# Patient Record
Sex: FEMALE | Race: WHITE | NOT HISPANIC OR LATINO | RURAL
[De-identification: names, ages, dates, MRNs, and addresses within clinical notes are randomized per-mention and may not be internally consistent; named-entity substitution may affect disease eponyms.]

---

## 2017-10-23 ENCOUNTER — OFFICE VISIT (OUTPATIENT)
Dept: RETAIL CLINIC | Facility: CLINIC | Age: 52
End: 2017-10-23

## 2017-10-23 VITALS
WEIGHT: 132.6 LBS | OXYGEN SATURATION: 99 % | RESPIRATION RATE: 15 BRPM | HEART RATE: 97 BPM | HEIGHT: 64 IN | BODY MASS INDEX: 22.64 KG/M2 | TEMPERATURE: 96.7 F

## 2017-10-23 DIAGNOSIS — J01.41 ACUTE RECURRENT PANSINUSITIS: Primary | ICD-10-CM

## 2017-10-23 PROCEDURE — 99203 OFFICE O/P NEW LOW 30 MIN: CPT | Performed by: NURSE PRACTITIONER

## 2017-10-23 RX ORDER — AMOXICILLIN AND CLAVULANATE POTASSIUM 875; 125 MG/1; MG/1
1 TABLET, FILM COATED ORAL 2 TIMES DAILY
Qty: 20 TABLET | Refills: 0 | Status: SHIPPED | OUTPATIENT
Start: 2017-10-23 | End: 2017-11-02

## 2017-10-23 RX ORDER — PSEUDOEPHEDRINE HCL 120 MG/1
120 TABLET, FILM COATED, EXTENDED RELEASE ORAL EVERY 12 HOURS
Qty: 20 TABLET | Refills: 0 | Status: SHIPPED | OUTPATIENT
Start: 2017-10-23 | End: 2017-11-02

## 2017-10-23 NOTE — PROGRESS NOTES
"Mack Parker is a 52 y.o. female.     Sinusitis   This is a new problem. The current episode started in the past 7 days. The problem has been gradually worsening since onset. The pain is severe. Associated symptoms include congestion, headaches, sinus pressure and swollen glands. Pertinent negatives include no chills, coughing, ear pain, hoarse voice, neck pain, shortness of breath, sneezing or sore throat. Past treatments include nothing. The treatment provided no relief.        The following portions of the patient's history were reviewed and updated as appropriate: allergies, current medications, past medical history, past social history, past surgical history and problem list.    Review of Systems   Constitutional: Negative for appetite change, chills and fever.   HENT: Positive for congestion, postnasal drip, rhinorrhea, sinus pain and sinus pressure. Negative for ear pain, hoarse voice, sneezing, sore throat and trouble swallowing.    Eyes: Negative.    Respiratory: Negative for cough, shortness of breath and wheezing.    Cardiovascular: Negative.    Gastrointestinal: Negative for abdominal pain, diarrhea, nausea and vomiting.   Musculoskeletal: Negative.  Negative for neck pain.   Skin: Negative.    Neurological: Positive for headaches. Negative for dizziness.   Hematological: Positive for adenopathy.        Pulse 97  Temp 96.7 °F (35.9 °C) (Temporal Artery )   Resp 15  Ht 64\" (162.6 cm)  Wt 132 lb 9.6 oz (60.1 kg)  LMP  (LMP Unknown)  SpO2 99%  BMI 22.76 kg/m2     Objective   Physical Exam   Constitutional: She is oriented to person, place, and time. Vital signs are normal. She appears well-developed and well-nourished.   HENT:   Head: Normocephalic.   Right Ear: External ear and ear canal normal. No drainage, swelling or tenderness. Tympanic membrane is bulging. Tympanic membrane is not erythematous.   Left Ear: External ear and ear canal normal. No drainage, swelling or tenderness. " Tympanic membrane is bulging. Tympanic membrane is not erythematous.   Nose: Mucosal edema and rhinorrhea present. Right sinus exhibits maxillary sinus tenderness (severe ethmoid) and frontal sinus tenderness. Left sinus exhibits maxillary sinus tenderness (severe ethmoid) and frontal sinus tenderness.   Mouth/Throat: Uvula is midline, oropharynx is clear and moist and mucous membranes are normal. Tonsils are 0 on the right. Tonsils are 0 on the left. No tonsillar exudate.   Eyes: Conjunctivae are normal. Pupils are equal, round, and reactive to light.   Cardiovascular: Normal rate, regular rhythm, S1 normal, S2 normal and normal heart sounds.    Pulmonary/Chest: Effort normal and breath sounds normal. No respiratory distress. She has no wheezes.   Lymphadenopathy:        Head (right side): Tonsillar adenopathy present.        Head (left side): Tonsillar adenopathy present.     She has no cervical adenopathy.   Neurological: She is alert and oriented to person, place, and time.   Skin: Skin is warm, dry and intact. No rash noted.   Psychiatric: She has a normal mood and affect. Her speech is normal and behavior is normal. Thought content normal.   Vitals reviewed.      Assessment/Plan   Rachelle was seen today for sinusitis.    Diagnoses and all orders for this visit:    Acute recurrent pansinusitis  -     amoxicillin-clavulanate (AUGMENTIN) 875-125 MG per tablet; Take 1 tablet by mouth 2 (Two) Times a Day for 10 days.  -     pseudoephedrine (SUDAFED) 120 MG 12 hr tablet; Take 1 tablet by mouth Every 12 (Twelve) Hours for 10 days.

## 2017-11-02 ENCOUNTER — OFFICE VISIT (OUTPATIENT)
Dept: RETAIL CLINIC | Facility: CLINIC | Age: 52
End: 2017-11-02

## 2017-11-02 VITALS
BODY MASS INDEX: 22.2 KG/M2 | TEMPERATURE: 97.7 F | WEIGHT: 130 LBS | HEART RATE: 103 BPM | HEIGHT: 64 IN | RESPIRATION RATE: 20 BRPM | OXYGEN SATURATION: 97 %

## 2017-11-02 DIAGNOSIS — R05.9 COUGHING: Primary | ICD-10-CM

## 2017-11-02 PROCEDURE — 99213 OFFICE O/P EST LOW 20 MIN: CPT | Performed by: NURSE PRACTITIONER

## 2017-11-02 RX ORDER — BENZONATATE 200 MG/1
200 CAPSULE ORAL 3 TIMES DAILY PRN
Qty: 30 CAPSULE | Refills: 0 | Status: SHIPPED | OUTPATIENT
Start: 2017-11-02 | End: 2018-11-25

## 2017-11-02 RX ORDER — METHYLPREDNISOLONE 4 MG/1
TABLET ORAL
Qty: 21 TABLET | Refills: 0 | Status: SHIPPED | OUTPATIENT
Start: 2017-11-02 | End: 2018-02-13

## 2017-11-02 RX ORDER — DEXTROMETHORPHAN HYDROBROMIDE AND PROMETHAZINE HYDROCHLORIDE 15; 6.25 MG/5ML; MG/5ML
5 SYRUP ORAL 4 TIMES DAILY PRN
Qty: 240 ML | Refills: 0 | Status: SHIPPED | OUTPATIENT
Start: 2017-11-02 | End: 2017-11-12

## 2017-11-02 NOTE — PROGRESS NOTES
"Mack Parker is a 52 y.o. female.     Cough   This is a new problem. The current episode started in the past 7 days. The problem has been gradually worsening. The problem occurs every few minutes. The cough is non-productive. Associated symptoms include nasal congestion, postnasal drip and rhinorrhea. Pertinent negatives include no chest pain, chills, ear pain, fever, headaches, myalgias, sore throat, shortness of breath, weight loss or wheezing. Risk factors for lung disease include smoking/tobacco exposure. She has tried OTC cough suppressant for the symptoms. The treatment provided no relief. Her past medical history is significant for bronchitis and pneumonia. There is no history of asthma.        The following portions of the patient's history were reviewed and updated as appropriate: allergies, current medications, past medical history, past social history, past surgical history and problem list.    Review of Systems   Constitutional: Negative for appetite change, chills, fever and weight loss.   HENT: Positive for congestion, postnasal drip and rhinorrhea. Negative for ear pain, sinus pressure, sneezing, sore throat and trouble swallowing.    Eyes: Negative.    Respiratory: Positive for cough (severe, persistent cough). Negative for chest tightness, shortness of breath and wheezing.    Cardiovascular: Negative.  Negative for chest pain.   Gastrointestinal: Negative for diarrhea, nausea and vomiting.   Musculoskeletal: Positive for arthralgias. Negative for myalgias.   Skin: Negative.    Neurological: Negative.  Negative for headaches.   Hematological: Negative for adenopathy.        Pulse 103  Temp 97.7 °F (36.5 °C)  Resp 20  Ht 64\" (162.6 cm)  Wt 130 lb (59 kg)  LMP  (LMP Unknown)  SpO2 97%  BMI 22.31 kg/m2     Objective   Physical Exam   Constitutional: She is oriented to person, place, and time. Vital signs are normal. She appears well-developed and well-nourished. No distress.   HENT: "   Head: Normocephalic.   Right Ear: Tympanic membrane, external ear and ear canal normal. No drainage, swelling or tenderness. Tympanic membrane is not erythematous and not bulging.   Left Ear: Tympanic membrane, external ear and ear canal normal. No drainage, swelling or tenderness. Tympanic membrane is not erythematous and not bulging.   Nose: Mucosal edema and rhinorrhea present. Right sinus exhibits no maxillary sinus tenderness and no frontal sinus tenderness. Left sinus exhibits no maxillary sinus tenderness and no frontal sinus tenderness.   Mouth/Throat: Uvula is midline, oropharynx is clear and moist and mucous membranes are normal. Tonsils are 0 on the right. Tonsils are 0 on the left. No tonsillar exudate.   Neck: Normal range of motion. Neck supple.   Cardiovascular: Normal rate, regular rhythm, S1 normal, S2 normal and normal heart sounds.    Pulmonary/Chest: Effort normal. No respiratory distress. She has decreased breath sounds in the right lower field and the left lower field. She has wheezes in the right upper field, the right middle field, the left upper field and the left middle field. She has no rhonchi. She has no rales.   Severe persistent cough during exam   Abdominal: Soft. Bowel sounds are normal. She exhibits no distension. There is no tenderness. There is no rebound and no guarding.   Lymphadenopathy:        Head (right side): No tonsillar adenopathy present.        Head (left side): No tonsillar adenopathy present.     She has no cervical adenopathy.   Neurological: She is alert and oriented to person, place, and time.   Skin: Skin is warm and dry. No rash noted. She is not diaphoretic.   Psychiatric: She has a normal mood and affect. Her speech is normal and behavior is normal. Thought content normal.   Vitals reviewed.      Assessment/Plan   Rachelle was seen today for cough.    Diagnoses and all orders for this visit:    Coughing  -     promethazine-dextromethorphan (PROMETHAZINE-DM)  6.25-15 MG/5ML syrup; Take 5 mL by mouth 4 (Four) Times a Day As Needed for Cough for up to 10 days.  -     benzonatate (TESSALON) 200 MG capsule; Take 1 capsule by mouth 3 (Three) Times a Day As Needed for Cough.  -     MethylPREDNISolone (MEDROL, AZEEM,) 4 MG tablet; Take as directed on package instructions.

## 2017-11-28 ENCOUNTER — HOSPITAL ENCOUNTER (OUTPATIENT)
Dept: HOSPITAL 22 - LAB | Age: 52
End: 2017-11-28
Attending: PHYSICIAN ASSISTANT
Payer: MEDICAID

## 2017-11-28 DIAGNOSIS — R51: ICD-10-CM

## 2017-11-28 DIAGNOSIS — Z79.899: ICD-10-CM

## 2017-11-28 DIAGNOSIS — E11.9: Primary | ICD-10-CM

## 2017-11-28 DIAGNOSIS — E55.9: ICD-10-CM

## 2017-11-28 LAB
BASOPHILS # BLD AUTO: 2.8 K/MM3 (ref 0.7–4.5)
BUN: 15 MG/DL (ref 7–18)
EOSINOPHIL NFR BLD AUTO: 41 % (ref 10–50)
GFR SERPLBLD BASED ON 1.73 SQ M-ARVRAT: 88 ML/MIN (ref 59–?)
HCT VFR BLD CALC: 16 G/DL (ref 12.2–16.2)

## 2017-11-30 LAB — VITAMIN B12: 345 PG/ML (ref 211–946)

## 2018-02-13 ENCOUNTER — OFFICE VISIT (OUTPATIENT)
Dept: RETAIL CLINIC | Facility: CLINIC | Age: 53
End: 2018-02-13

## 2018-02-13 VITALS
RESPIRATION RATE: 16 BRPM | HEIGHT: 64 IN | HEART RATE: 104 BPM | TEMPERATURE: 98.6 F | SYSTOLIC BLOOD PRESSURE: 92 MMHG | WEIGHT: 143 LBS | DIASTOLIC BLOOD PRESSURE: 52 MMHG | BODY MASS INDEX: 24.41 KG/M2 | OXYGEN SATURATION: 96 %

## 2018-02-13 DIAGNOSIS — R68.89 FLU-LIKE SYMPTOMS: Primary | ICD-10-CM

## 2018-02-13 DIAGNOSIS — Z20.828 EXPOSURE TO INFLUENZA: ICD-10-CM

## 2018-02-13 LAB
EXPIRATION DATE: NORMAL
FLUAV AG NPH QL: NORMAL
FLUBV AG NPH QL: NORMAL
INTERNAL CONTROL: NORMAL
Lab: NORMAL

## 2018-02-13 PROCEDURE — 87804 INFLUENZA ASSAY W/OPTIC: CPT | Performed by: NURSE PRACTITIONER

## 2018-02-13 PROCEDURE — 99213 OFFICE O/P EST LOW 20 MIN: CPT | Performed by: NURSE PRACTITIONER

## 2018-02-13 RX ORDER — OSELTAMIVIR PHOSPHATE 75 MG/1
75 CAPSULE ORAL
Qty: 10 CAPSULE | Refills: 0 | Status: SHIPPED | OUTPATIENT
Start: 2018-02-13 | End: 2018-02-18

## 2018-02-13 RX ORDER — PROPRANOLOL HYDROCHLORIDE 80 MG/1
TABLET ORAL DAILY
COMMUNITY
Start: 2014-10-08

## 2018-02-13 RX ORDER — ALBUTEROL SULFATE 90 UG/1
2 AEROSOL, METERED RESPIRATORY (INHALATION) EVERY 4 HOURS PRN
Qty: 1 INHALER | Refills: 0 | Status: SHIPPED | OUTPATIENT
Start: 2018-02-13 | End: 2018-11-25

## 2018-02-13 RX ORDER — GLIPIZIDE 5 MG/1
2.5 TABLET ORAL
COMMUNITY

## 2018-02-13 RX ORDER — OMEPRAZOLE 40 MG/1
CAPSULE, DELAYED RELEASE ORAL DAILY
COMMUNITY
Start: 2014-09-24

## 2018-02-13 RX ORDER — ONDANSETRON HYDROCHLORIDE 8 MG/1
1 TABLET, FILM COATED ORAL 3 TIMES DAILY PRN
COMMUNITY
Start: 2014-09-09 | End: 2018-02-13

## 2018-02-13 RX ORDER — BROMPHENIRAMINE MALEATE, PSEUDOEPHEDRINE HYDROCHLORIDE, AND DEXTROMETHORPHAN HYDROBROMIDE 2; 30; 10 MG/5ML; MG/5ML; MG/5ML
5 SYRUP ORAL 4 TIMES DAILY PRN
Qty: 100 ML | Refills: 0 | Status: SHIPPED | OUTPATIENT
Start: 2018-02-13 | End: 2018-02-18

## 2018-02-13 RX ORDER — PREGABALIN 300 MG/1
CAPSULE ORAL 2 TIMES DAILY
COMMUNITY
Start: 2014-06-27

## 2018-02-13 NOTE — PROGRESS NOTES
"Subjective   Rachelle Parker is a 52 y.o. female.   BP 92/52 (BP Location: Left arm, Patient Position: Sitting, Cuff Size: Adult)  Pulse 104  Temp 98.6 °F (37 °C) (Oral)   Resp 16  Ht 162.6 cm (64\")  Wt 64.9 kg (143 lb)  LMP  (LMP Unknown)  SpO2 96%  BMI 24.55 kg/m2   Past Medical History:   Diagnosis Date   • Acid reflux    • Type 2 diabetes mellitus      No Known Allergies      Flu Symptoms   This is a new problem. The current episode started yesterday. The problem has been rapidly worsening. Associated symptoms include chills, congestion, coughing, fatigue, a fever (100.9), headaches (mild) and myalgias. Pertinent negatives include no abdominal pain, anorexia, arthralgias, change in bowel habit, chest pain, joint swelling, nausea, sore throat, swollen glands, urinary symptoms, vertigo, visual change or vomiting.      + flu exposure within home and a work    The following portions of the patient's history were reviewed and updated as appropriate: allergies, current medications, past family history, past medical history, past social history, past surgical history and problem list.    Review of Systems   Constitutional: Positive for chills, fatigue and fever (100.9).   HENT: Positive for congestion. Negative for sore throat.    Respiratory: Positive for cough.    Cardiovascular: Negative for chest pain.   Gastrointestinal: Negative for abdominal pain, anorexia, change in bowel habit, nausea and vomiting.   Musculoskeletal: Positive for myalgias. Negative for arthralgias and joint swelling.   Neurological: Positive for headaches (mild). Negative for vertigo.       Objective   Physical Exam   Constitutional: She appears well-developed and well-nourished.  Non-toxic appearance. She has a sickly appearance. She appears ill.   HENT:   Head: Normocephalic and atraumatic.   Right Ear: Tympanic membrane and ear canal normal.   Left Ear: Tympanic membrane and ear canal normal.   Nose: Mucosal edema and rhinorrhea " present.   Mouth/Throat: Uvula is midline. Posterior oropharyngeal erythema (mild) present. No tonsillar exudate.   Cardiovascular: Regular rhythm and normal heart sounds.    Pulmonary/Chest: Effort normal. She has wheezes (trace). She has rhonchi (moderate to severe). She has no rales.   Lymphadenopathy:     She has no cervical adenopathy.   Skin: Skin is warm and dry.       Assessment/Plan   Rachelle was seen today for flu symptoms.    Diagnoses and all orders for this visit:    Flu-like symptoms  -     POCT Influenza A/B    Exposure to influenza    Other orders  -     albuterol (PROVENTIL HFA;VENTOLIN HFA) 108 (90 Base) MCG/ACT inhaler; Inhale 2 puffs Every 4 (Four) Hours As Needed for Wheezing or Shortness of Air.  -     oseltamivir (TAMIFLU) 75 MG capsule; Take 1 capsule by mouth 2 (Two) Times a Day for 5 days.  -     brompheniramine-pseudoephedrine-DM 30-2-10 MG/5ML syrup; Take 5 mL by mouth 4 (Four) Times a Day As Needed for Cough for up to 5 days.        Results for orders placed or performed in visit on 02/13/18   POCT Influenza A/B   Result Value Ref Range    Rapid Influenza A Ag neg     Rapid Influenza B Ag neg     Internal Control Passed Passed    Lot Number [id number]     Expiration Date 11/2020

## 2018-02-13 NOTE — PATIENT INSTRUCTIONS

## 2018-02-28 ENCOUNTER — HOSPITAL ENCOUNTER (OUTPATIENT)
Age: 53
End: 2018-02-28
Payer: COMMERCIAL

## 2018-02-28 DIAGNOSIS — R51: ICD-10-CM

## 2018-02-28 DIAGNOSIS — G44.029: ICD-10-CM

## 2018-02-28 DIAGNOSIS — R42: Primary | ICD-10-CM

## 2018-02-28 LAB
BUN SERPL-MCNC: 16 MG/DL (ref 7–18)
CREAT BLD-SCNC: 0.74 MG/DL (ref 0.55–1.02)
ESTIMATED GLOMERULAR FILT RATE: 82 ML/MIN (ref 60–?)
GFR (AFRICAN AMERICAN): 100 ML/MIN (ref 60–?)

## 2018-02-28 PROCEDURE — 84520 ASSAY OF UREA NITROGEN: CPT

## 2018-02-28 PROCEDURE — A9576 INJ PROHANCE MULTIPACK: HCPCS

## 2018-02-28 PROCEDURE — 36415 COLL VENOUS BLD VENIPUNCTURE: CPT

## 2018-02-28 PROCEDURE — 82565 ASSAY OF CREATININE: CPT

## 2018-02-28 PROCEDURE — 70553 MRI BRAIN STEM W/O & W/DYE: CPT

## 2018-05-30 ENCOUNTER — HOSPITAL ENCOUNTER (OUTPATIENT)
Dept: HOSPITAL 22 - LAB | Age: 53
End: 2018-05-30
Payer: COMMERCIAL

## 2018-05-30 DIAGNOSIS — E11.9: Primary | ICD-10-CM

## 2018-05-30 LAB
ALBUMIN LEVEL: 4.2 GM/DL (ref 3.4–5)
ALBUMIN/GLOB SERPL: 1.3 {RATIO} (ref 1.1–1.8)
ALP ISO SERPL-ACNC: 79 U/L (ref 46–116)
ALT SERPLBLD-CCNC: 38 U/L (ref 12–78)
ANION GAP SERPL CALC-SCNC: 13.2 MEQ/L (ref 5–15)
AST SERPL QL: 15 U/L (ref 15–37)
BILIRUBIN,TOTAL: 0.4 MG/DL (ref 0.2–1)
BUN SERPL-MCNC: 16 MG/DL (ref 7–18)
CALCIUM SPEC-MCNC: 9.4 MG/DL (ref 8.5–10.1)
CHLORIDE SPEC-SCNC: 103 MMOL/L (ref 98–107)
CHOLEST SPEC-SCNC: 287 MG/DL (ref 140–200)
CO2 SERPL-SCNC: 27 MMOL/L (ref 21–32)
CREAT BLD-SCNC: 0.8 MG/DL (ref 0.55–1.02)
ESTIMATED GLOMERULAR FILT RATE: 75 ML/MIN (ref 60–?)
GFR (AFRICAN AMERICAN): 91 ML/MIN (ref 60–?)
GLOBULIN SER CALC-MCNC: 3.2 GM/DL (ref 1.3–3.2)
GLUCOSE: 282 MG/DL (ref 74–106)
HBA1C MFR BLD: 9.8 % (ref 0–7)
HCT VFR BLD CALC: 53 % (ref 37–47)
HDLC SERPL-MCNC: 45 MG/DL (ref 29–89)
HGB BLD-MCNC: 16.7 G/DL (ref 12.2–16.2)
MCHC RBC-ENTMCNC: 31.5 G/DL (ref 31.8–35.4)
MCV RBC: 96.7 FL (ref 81–99)
MEAN CORPUSCULAR HEMOGLOBIN: 30.5 PG (ref 27–31.2)
PLATELET # BLD: 249 K/MM3 (ref 142–424)
POTASSIUM: 4.2 MMOL/L (ref 3.5–5.1)
PROT SERPL-MCNC: 7.4 GM/DL (ref 6.4–8.2)
RBC # BLD AUTO: 5.48 M/MM3 (ref 4.2–5.4)
SODIUM SPEC-SCNC: 139 MMOL/L (ref 136–145)
T4 (THYROXINE): 10.7 UG/DL (ref 4.7–13.3)
TRIGLYCERIDES: 195 MG/DL (ref 30–200)
TSH SERPL-ACNC: 0.4 UIU/ML (ref 0.36–3.74)
WBC # BLD AUTO: 7.8 K/MM3 (ref 4.8–10.8)

## 2018-05-30 PROCEDURE — 80053 COMPREHEN METABOLIC PANEL: CPT

## 2018-05-30 PROCEDURE — 82652 VIT D 1 25-DIHYDROXY: CPT

## 2018-05-30 PROCEDURE — 80061 LIPID PANEL: CPT

## 2018-05-30 PROCEDURE — 84443 ASSAY THYROID STIM HORMONE: CPT

## 2018-05-30 PROCEDURE — 85025 COMPLETE CBC W/AUTO DIFF WBC: CPT

## 2018-05-30 PROCEDURE — 83036 HEMOGLOBIN GLYCOSYLATED A1C: CPT

## 2018-05-30 PROCEDURE — 84436 ASSAY OF TOTAL THYROXINE: CPT

## 2018-10-29 ENCOUNTER — HOSPITAL ENCOUNTER (OUTPATIENT)
Age: 53
End: 2018-10-29
Payer: COMMERCIAL

## 2018-10-29 DIAGNOSIS — E11.40: Primary | ICD-10-CM

## 2018-10-29 DIAGNOSIS — E11.9: ICD-10-CM

## 2018-10-29 LAB
ALBUMIN LEVEL: 3.9 GM/DL (ref 3.4–5)
ALBUMIN/GLOB SERPL: 1.3 {RATIO} (ref 1.1–1.8)
ALP ISO SERPL-ACNC: 89 U/L (ref 46–116)
ALT SERPLBLD-CCNC: 41 U/L (ref 12–78)
ANION GAP SERPL CALC-SCNC: 16.6 MEQ/L (ref 5–15)
AST SERPL QL: 18 U/L (ref 15–37)
BILIRUBIN,TOTAL: 0.5 MG/DL (ref 0.2–1)
BUN SERPL-MCNC: 16 MG/DL (ref 7–18)
CALCIUM SPEC-MCNC: 9 MG/DL (ref 8.5–10.1)
CHLORIDE SPEC-SCNC: 102 MMOL/L (ref 98–107)
CHOLEST SPEC-SCNC: 271 MG/DL (ref 140–200)
CO2 SERPL-SCNC: 24 MMOL/L (ref 21–32)
CREAT BLD-SCNC: 0.75 MG/DL (ref 0.55–1.02)
ESTIMATED GLOMERULAR FILT RATE: 81 ML/MIN (ref 60–?)
GFR (AFRICAN AMERICAN): 98 ML/MIN (ref 60–?)
GLOBULIN SER CALC-MCNC: 3 GM/DL (ref 1.3–3.2)
GLUCOSE: 462 MG/DL (ref 74–106)
HBA1C MFR BLD: 10.5 % (ref 0–7)
HCT VFR BLD CALC: 50.3 % (ref 37–47)
HDLC SERPL-MCNC: 48 MG/DL (ref 29–89)
HGB BLD-MCNC: 16.1 G/DL (ref 12.2–16.2)
MCHC RBC-ENTMCNC: 32 G/DL (ref 31.8–35.4)
MCV RBC: 97.3 FL (ref 81–99)
MEAN CORPUSCULAR HEMOGLOBIN: 31.1 PG (ref 27–31.2)
PLATELET # BLD: 205 K/MM3 (ref 142–424)
POTASSIUM: 4.6 MMOL/L (ref 3.5–5.1)
PROT SERPL-MCNC: 6.9 GM/DL (ref 6.4–8.2)
RBC # BLD AUTO: 5.17 M/MM3 (ref 4.2–5.4)
SODIUM SPEC-SCNC: 138 MMOL/L (ref 136–145)
T4 (THYROXINE): 10.8 UG/DL (ref 4.7–13.3)
TRIGLYCERIDES: 217 MG/DL (ref 30–200)
TSH SERPL-ACNC: 0.58 UIU/ML (ref 0.36–3.74)
WBC # BLD AUTO: 6.8 K/MM3 (ref 4.8–10.8)

## 2018-10-29 PROCEDURE — 85025 COMPLETE CBC W/AUTO DIFF WBC: CPT

## 2018-10-29 PROCEDURE — 82652 VIT D 1 25-DIHYDROXY: CPT

## 2018-10-29 PROCEDURE — 80053 COMPREHEN METABOLIC PANEL: CPT

## 2018-10-29 PROCEDURE — 83036 HEMOGLOBIN GLYCOSYLATED A1C: CPT

## 2018-10-29 PROCEDURE — 84443 ASSAY THYROID STIM HORMONE: CPT

## 2018-10-29 PROCEDURE — 80061 LIPID PANEL: CPT

## 2018-10-29 PROCEDURE — 84436 ASSAY OF TOTAL THYROXINE: CPT

## 2018-11-25 ENCOUNTER — OFFICE VISIT (OUTPATIENT)
Dept: RETAIL CLINIC | Facility: CLINIC | Age: 53
End: 2018-11-25

## 2018-11-25 VITALS
TEMPERATURE: 98.7 F | BODY MASS INDEX: 22.26 KG/M2 | SYSTOLIC BLOOD PRESSURE: 118 MMHG | RESPIRATION RATE: 18 BRPM | HEIGHT: 64 IN | WEIGHT: 130.4 LBS | HEART RATE: 91 BPM | DIASTOLIC BLOOD PRESSURE: 84 MMHG | OXYGEN SATURATION: 96 %

## 2018-11-25 DIAGNOSIS — J40 BRONCHITIS: Primary | ICD-10-CM

## 2018-11-25 DIAGNOSIS — R68.89 FLU-LIKE SYMPTOMS: ICD-10-CM

## 2018-11-25 DIAGNOSIS — J02.9 SORE THROAT: ICD-10-CM

## 2018-11-25 DIAGNOSIS — R05.3 COUGH, PERSISTENT: ICD-10-CM

## 2018-11-25 LAB
EXPIRATION DATE: NORMAL
EXPIRATION DATE: NORMAL
FLUAV AG NPH QL: NEGATIVE
FLUBV AG NPH QL: NEGATIVE
INTERNAL CONTROL: NORMAL
INTERNAL CONTROL: NORMAL
Lab: NORMAL
Lab: NORMAL
S PYO AG THROAT QL: NEGATIVE

## 2018-11-25 PROCEDURE — 87804 INFLUENZA ASSAY W/OPTIC: CPT | Performed by: NURSE PRACTITIONER

## 2018-11-25 PROCEDURE — 87880 STREP A ASSAY W/OPTIC: CPT | Performed by: NURSE PRACTITIONER

## 2018-11-25 PROCEDURE — 99213 OFFICE O/P EST LOW 20 MIN: CPT | Performed by: NURSE PRACTITIONER

## 2018-11-25 RX ORDER — ALBUTEROL SULFATE 90 UG/1
2 AEROSOL, METERED RESPIRATORY (INHALATION) EVERY 4 HOURS PRN
Qty: 1 INHALER | Refills: 0 | Status: SHIPPED | OUTPATIENT
Start: 2018-11-25

## 2018-11-25 RX ORDER — AMOXICILLIN AND CLAVULANATE POTASSIUM 875; 125 MG/1; MG/1
1 TABLET, FILM COATED ORAL 2 TIMES DAILY
Qty: 14 TABLET | Refills: 0 | Status: SHIPPED | OUTPATIENT
Start: 2018-11-25 | End: 2018-12-02

## 2018-11-25 RX ORDER — DEXTROMETHORPHAN HYDROBROMIDE AND PROMETHAZINE HYDROCHLORIDE 15; 6.25 MG/5ML; MG/5ML
5 SYRUP ORAL 4 TIMES DAILY PRN
Qty: 120 ML | Refills: 0 | Status: SHIPPED | OUTPATIENT
Start: 2018-11-25 | End: 2018-11-30

## 2018-11-25 NOTE — PROGRESS NOTES
"Mack Parker is a 53 y.o. female.   /84   Pulse 91   Temp 98.7 °F (37.1 °C)   Resp 18   Ht 162.6 cm (64\")   Wt 59.1 kg (130 lb 6.4 oz)   LMP  (LMP Unknown)   SpO2 96%   BMI 22.38 kg/m²   Past Medical History:   Diagnosis Date   • Acid reflux    • Type 2 diabetes mellitus (CMS/HCC)      No Known Allergies      Cough   This is a new problem. The current episode started 1 to 4 weeks ago. The problem has been gradually worsening (started to feel better but took a turn for the worst last week). The problem occurs every few minutes. The cough is productive of purulent sputum. Associated symptoms include a fever (subjective), nasal congestion, rhinorrhea, a sore throat, shortness of breath and sweats. Pertinent negatives include no chills, ear congestion, ear pain, heartburn, hemoptysis, myalgias, postnasal drip, rash, weight loss or wheezing. Chest pain: due to cough.   Sore Throat    Associated symptoms include coughing and shortness of breath. Pertinent negatives include no ear pain.        The following portions of the patient's history were reviewed and updated as appropriate: allergies, current medications, past family history, past medical history, past social history, past surgical history and problem list.    Review of Systems   Constitutional: Positive for fever (subjective). Negative for chills and weight loss.   HENT: Positive for rhinorrhea and sore throat. Negative for ear pain and postnasal drip.    Respiratory: Positive for cough and shortness of breath. Negative for hemoptysis and wheezing.    Cardiovascular: Chest pain: due to cough.   Gastrointestinal: Negative for heartburn.   Musculoskeletal: Negative for myalgias.   Skin: Negative for rash.       Objective   Physical Exam   Constitutional: She appears well-developed and well-nourished.  Non-toxic appearance. She appears ill.   HENT:   Head: Normocephalic and atraumatic.   Right Ear: Tympanic membrane and ear canal normal. "   Left Ear: Tympanic membrane and ear canal normal.   Nose: Mucosal edema and rhinorrhea present. Right sinus exhibits no maxillary sinus tenderness and no frontal sinus tenderness. Left sinus exhibits no maxillary sinus tenderness and no frontal sinus tenderness.   Mouth/Throat: Uvula is midline. Posterior oropharyngeal erythema present.   Cardiovascular: Regular rhythm and normal heart sounds.   Pulmonary/Chest: Effort normal. She has no wheezes. She has no rhonchi. She has no rales.   Lymphadenopathy:     She has no cervical adenopathy.   Skin: Skin is warm and dry.       Assessment/Plan   Rachelle was seen today for cough and sore throat.    Diagnoses and all orders for this visit:    Bronchitis    Cough, persistent    Other orders  -     albuterol (PROVENTIL HFA;VENTOLIN HFA) 108 (90 Base) MCG/ACT inhaler; Inhale 2 puffs Every 4 (Four) Hours As Needed for Wheezing or Shortness of Air.  -     amoxicillin-clavulanate (AUGMENTIN) 875-125 MG per tablet; Take 1 tablet by mouth 2 (Two) Times a Day for 7 days.      Results for orders placed or performed in visit on 11/25/18   POC Rapid Strep A   Result Value Ref Range    Rapid Strep A Screen Negative Negative, VALID, INVALID, Not Performed    Internal Control Passed Passed    Lot Number [id number]     Expiration Date 12,312,019    POC Influenza A / B   Result Value Ref Range    Rapid Influenza A Ag NEGATIVE Negative    Rapid Influenza B Ag NEGATIVE Negative    Internal Control Passed Passed    Lot Number [id number]     Expiration Date 12,312,019

## 2019-02-09 ENCOUNTER — OFFICE VISIT (OUTPATIENT)
Dept: RETAIL CLINIC | Facility: CLINIC | Age: 54
End: 2019-02-09

## 2019-02-09 VITALS
WEIGHT: 135 LBS | DIASTOLIC BLOOD PRESSURE: 78 MMHG | BODY MASS INDEX: 23.05 KG/M2 | HEART RATE: 117 BPM | OXYGEN SATURATION: 93 % | TEMPERATURE: 99.5 F | HEIGHT: 64 IN | RESPIRATION RATE: 12 BRPM | SYSTOLIC BLOOD PRESSURE: 122 MMHG

## 2019-02-09 DIAGNOSIS — R50.9 FEVER AND CHILLS: ICD-10-CM

## 2019-02-09 DIAGNOSIS — J40 BRONCHITIS: Primary | ICD-10-CM

## 2019-02-09 PROBLEM — E11.42 DIABETES MELLITUS WITH POLYNEUROPATHY (HCC): Status: ACTIVE | Noted: 2019-02-09

## 2019-02-09 LAB
EXPIRATION DATE: NORMAL
FLUAV AG NPH QL: NEGATIVE
FLUBV AG NPH QL: NEGATIVE
INTERNAL CONTROL: NORMAL
Lab: NORMAL

## 2019-02-09 PROCEDURE — 99213 OFFICE O/P EST LOW 20 MIN: CPT | Performed by: NURSE PRACTITIONER

## 2019-02-09 PROCEDURE — 87804 INFLUENZA ASSAY W/OPTIC: CPT | Performed by: NURSE PRACTITIONER

## 2019-02-09 RX ORDER — ERGOCALCIFEROL 1.25 MG/1
CAPSULE ORAL
Refills: 0 | COMMUNITY
Start: 2019-01-30

## 2019-02-09 RX ORDER — INSULIN ASPART 100 [IU]/ML
INJECTION, SUSPENSION SUBCUTANEOUS
Refills: 2 | COMMUNITY
Start: 2019-02-02

## 2019-02-09 RX ORDER — BROMPHENIRAMINE MALEATE, PSEUDOEPHEDRINE HYDROCHLORIDE, AND DEXTROMETHORPHAN HYDROBROMIDE 2; 30; 10 MG/5ML; MG/5ML; MG/5ML
5 SYRUP ORAL 4 TIMES DAILY PRN
Qty: 240 ML | Refills: 0 | Status: SHIPPED | OUTPATIENT
Start: 2019-02-09 | End: 2019-02-14

## 2019-02-09 RX ORDER — IBUPROFEN 600 MG/1
TABLET ORAL
Refills: 2 | COMMUNITY
Start: 2019-01-24

## 2019-02-09 RX ORDER — LATANOPROST 50 UG/ML
SOLUTION/ DROPS OPHTHALMIC
COMMUNITY
Start: 2019-01-07

## 2019-02-09 RX ORDER — PREDNISONE 10 MG/1
TABLET ORAL DAILY
Qty: 21 EACH | Refills: 0 | Status: SHIPPED | OUTPATIENT
Start: 2019-02-09 | End: 2019-02-15

## 2019-02-09 RX ORDER — DOXYCYCLINE 100 MG/1
100 CAPSULE ORAL 2 TIMES DAILY
Qty: 20 CAPSULE | Refills: 0 | Status: SHIPPED | OUTPATIENT
Start: 2019-02-09 | End: 2019-02-19

## 2019-02-09 NOTE — PROGRESS NOTES
"Mack Parker is a 53 y.o. female.     URI    This is a new problem. Episode onset: 2 days. The problem has been gradually worsening. The maximum temperature recorded prior to her arrival was 100.4 - 100.9 F. Associated symptoms include chest pain, congestion, coughing, rhinorrhea, sneezing and wheezing. Pertinent negatives include no abdominal pain, diarrhea, ear pain, headaches, nausea, neck pain, plugged ear sensation, rash, sinus pain, sore throat, swollen glands or vomiting. Treatments tried: otc cough and cold medication. The treatment provided no relief.        The following portions of the patient's history were reviewed and updated as appropriate: allergies, current medications, past medical history, past social history, past surgical history and problem list.    Review of Systems   Constitutional: Positive for appetite change, chills, fatigue and fever.   HENT: Positive for congestion, postnasal drip, rhinorrhea and sneezing. Negative for ear pain, sinus pressure, sinus pain, sore throat and trouble swallowing.    Eyes: Negative.    Respiratory: Positive for cough, chest tightness, shortness of breath and wheezing.    Cardiovascular: Positive for chest pain.   Gastrointestinal: Negative for abdominal pain, diarrhea, nausea and vomiting.   Musculoskeletal: Positive for myalgias. Negative for arthralgias and neck pain.   Skin: Negative.  Negative for rash.   Neurological: Negative.  Negative for headaches.   Hematological: Negative for adenopathy.        /78   Pulse 117   Temp 99.5 °F (37.5 °C) (Oral)   Resp 12   Ht 162.6 cm (64\")   Wt 61.2 kg (135 lb)   LMP  (LMP Unknown)   SpO2 93%   BMI 23.17 kg/m²      Objective   Physical Exam   Constitutional: She is oriented to person, place, and time. She appears well-developed and well-nourished. No distress.   HENT:   Head: Normocephalic.   Right Ear: Tympanic membrane, external ear and ear canal normal. No drainage, swelling or " tenderness. Tympanic membrane is not erythematous and not bulging.   Left Ear: Tympanic membrane, external ear and ear canal normal. No drainage, swelling or tenderness. Tympanic membrane is not erythematous and not bulging.   Nose: Mucosal edema and rhinorrhea present. Right sinus exhibits no maxillary sinus tenderness and no frontal sinus tenderness. Left sinus exhibits no maxillary sinus tenderness and no frontal sinus tenderness.   Mouth/Throat: Uvula is midline, oropharynx is clear and moist and mucous membranes are normal. Tonsils are 0 on the right. Tonsils are 0 on the left. No tonsillar exudate.   Neck: Normal range of motion. Neck supple.   Cardiovascular: Regular rhythm, S1 normal, S2 normal and normal heart sounds. Tachycardia present.   Pulmonary/Chest: Effort normal. No stridor. No respiratory distress. She has no decreased breath sounds. She has wheezes in the right upper field, the right middle field, the right lower field, the left upper field, the left middle field and the left lower field. She has rhonchi in the right upper field, the right middle field, the left upper field and the left middle field. She has no rales.   Abdominal: Soft. Bowel sounds are normal. She exhibits no distension. There is no tenderness. There is no rebound and no guarding.   Lymphadenopathy:        Head (right side): No tonsillar adenopathy present.        Head (left side): No tonsillar adenopathy present.     She has no cervical adenopathy.   Neurological: She is alert and oriented to person, place, and time.   Skin: Skin is warm and dry. No rash noted. She is not diaphoretic.   Psychiatric: She has a normal mood and affect. Her speech is normal and behavior is normal. Thought content normal.   Vitals reviewed.       Results for orders placed or performed in visit on 02/09/19   POC Influenza A / B   Result Value Ref Range    Rapid Influenza A Ag Negative Negative    Rapid Influenza B Ag Negative Negative    Internal  Control Passed Passed    Lot Number [id number]     Expiration Date 42,021         Assessment/Plan   Rachelle was seen today for uri.    Diagnoses and all orders for this visit:    Bronchitis  -     doxycycline (MONODOX) 100 MG capsule; Take 1 capsule by mouth 2 (Two) Times a Day for 10 days.  -     PredniSONE (DELTASONE) 10 MG (21) tablet pack; Take  by mouth Daily for 6 days. Use as directed on package  -     brompheniramine-pseudoephedrine-DM 30-2-10 MG/5ML syrup; Take 5 mL by mouth 4 (Four) Times a Day As Needed for Cough for up to 5 days.    Fever and chills  -     POC Influenza A / B

## 2019-09-19 ENCOUNTER — HOSPITAL ENCOUNTER (OUTPATIENT)
Age: 54
End: 2019-09-19
Payer: COMMERCIAL

## 2019-09-19 DIAGNOSIS — M75.101: Primary | ICD-10-CM

## 2019-09-19 PROCEDURE — 73221 MRI JOINT UPR EXTREM W/O DYE: CPT

## 2019-09-30 ENCOUNTER — HOSPITAL ENCOUNTER (OUTPATIENT)
Age: 54
End: 2019-09-30
Payer: COMMERCIAL

## 2019-09-30 DIAGNOSIS — Z79.84: ICD-10-CM

## 2019-09-30 DIAGNOSIS — E55.9: ICD-10-CM

## 2019-09-30 DIAGNOSIS — E11.9: Primary | ICD-10-CM

## 2019-09-30 LAB
ALBUMIN LEVEL: 4.2 GM/DL (ref 3.4–5)
ALBUMIN/GLOB SERPL: 1.4 {RATIO} (ref 1.1–1.8)
ALP ISO SERPL-ACNC: 73 U/L (ref 46–116)
ALT SERPLBLD-CCNC: 23 U/L (ref 12–78)
ANION GAP SERPL CALC-SCNC: 16.4 MEQ/L (ref 5–15)
AST SERPL QL: 9 U/L (ref 15–37)
BILIRUBIN,TOTAL: 0.5 MG/DL (ref 0.2–1)
BUN SERPL-MCNC: 18 MG/DL (ref 7–18)
CALCIUM SPEC-MCNC: 9.5 MG/DL (ref 8.5–10.1)
CHLORIDE SPEC-SCNC: 100 MMOL/L (ref 98–107)
CHOLEST SPEC-SCNC: 266 MG/DL (ref 140–200)
CO2 SERPL-SCNC: 24 MMOL/L (ref 21–32)
CREAT BLD-SCNC: 0.75 MG/DL (ref 0.55–1.02)
ESTIMATED GLOMERULAR FILT RATE: 81 ML/MIN (ref 60–?)
GFR (AFRICAN AMERICAN): 98 ML/MIN (ref 60–?)
GLOBULIN SER CALC-MCNC: 3.1 GM/DL (ref 1.3–3.2)
GLUCOSE: 350 MG/DL (ref 74–106)
HBA1C MFR BLD: 10.8 % (ref 0–7)
HCT VFR BLD CALC: 51 % (ref 37–47)
HDLC SERPL-MCNC: 47 MG/DL (ref 29–89)
HGB BLD-MCNC: 15.9 G/DL (ref 12.2–16.2)
MCHC RBC-ENTMCNC: 31.1 G/DL (ref 31.8–35.4)
MCV RBC: 98.7 FL (ref 81–99)
MEAN CORPUSCULAR HEMOGLOBIN: 30.7 PG (ref 27–31.2)
PLATELET # BLD: 216 K/MM3 (ref 142–424)
POTASSIUM: 4.4 MMOL/L (ref 3.5–5.1)
PROT SERPL-MCNC: 7.3 GM/DL (ref 6.4–8.2)
RBC # BLD AUTO: 5.17 M/MM3 (ref 4.2–5.4)
SODIUM SPEC-SCNC: 136 MMOL/L (ref 136–145)
T4 (THYROXINE): 11.7 UG/DL (ref 4.7–13.3)
TRIGLYCERIDES: 109 MG/DL (ref 30–200)
TSH SERPL-ACNC: 0.62 UIU/ML (ref 0.36–3.74)
WBC # BLD AUTO: 7.2 K/MM3 (ref 4.8–10.8)

## 2019-09-30 PROCEDURE — 82652 VIT D 1 25-DIHYDROXY: CPT

## 2019-09-30 PROCEDURE — 84436 ASSAY OF TOTAL THYROXINE: CPT

## 2019-09-30 PROCEDURE — 80061 LIPID PANEL: CPT

## 2019-09-30 PROCEDURE — 84443 ASSAY THYROID STIM HORMONE: CPT

## 2019-09-30 PROCEDURE — 80053 COMPREHEN METABOLIC PANEL: CPT

## 2019-09-30 PROCEDURE — 85025 COMPLETE CBC W/AUTO DIFF WBC: CPT

## 2019-09-30 PROCEDURE — 83036 HEMOGLOBIN GLYCOSYLATED A1C: CPT

## 2020-02-05 RX ORDER — ALBUTEROL SULFATE 90 UG/1
AEROSOL, METERED RESPIRATORY (INHALATION)
Qty: 18 INHALER | Refills: 0 | OUTPATIENT
Start: 2020-02-05

## 2020-10-13 ENCOUNTER — HOSPITAL ENCOUNTER (OUTPATIENT)
Age: 55
End: 2020-10-13
Payer: COMMERCIAL

## 2020-10-13 VITALS — HEART RATE: 99 BPM

## 2020-10-13 DIAGNOSIS — J44.9: Primary | ICD-10-CM

## 2020-10-13 PROCEDURE — 94618 PULMONARY STRESS TESTING: CPT

## 2020-10-13 PROCEDURE — 94640 AIRWAY INHALATION TREATMENT: CPT

## 2020-10-13 PROCEDURE — 94727 GAS DIL/WSHOT DETER LNG VOL: CPT

## 2020-10-13 PROCEDURE — 94729 DIFFUSING CAPACITY: CPT

## 2020-10-13 PROCEDURE — 94060 EVALUATION OF WHEEZING: CPT

## 2020-10-28 ENCOUNTER — HOSPITAL ENCOUNTER (OUTPATIENT)
Age: 55
End: 2020-10-28
Payer: COMMERCIAL

## 2020-10-28 DIAGNOSIS — Z87.891: Primary | ICD-10-CM

## 2020-10-28 DIAGNOSIS — Z12.2: ICD-10-CM

## 2020-12-03 ENCOUNTER — HOSPITAL ENCOUNTER (OUTPATIENT)
Age: 55
End: 2020-12-03
Payer: COMMERCIAL

## 2020-12-03 DIAGNOSIS — Z03.818: Primary | ICD-10-CM

## 2020-12-03 PROCEDURE — U0004 COV-19 TEST NON-CDC HGH THRU: HCPCS

## 2020-12-04 LAB — COVID-19 NASAL PCR SENDOUT LEX: NOT DETECTED

## 2020-12-08 ENCOUNTER — HOSPITAL ENCOUNTER (OUTPATIENT)
Age: 55
End: 2020-12-08
Payer: COMMERCIAL

## 2020-12-08 DIAGNOSIS — I25.10: ICD-10-CM

## 2020-12-08 DIAGNOSIS — R06.00: ICD-10-CM

## 2020-12-08 DIAGNOSIS — R07.9: Primary | ICD-10-CM

## 2020-12-08 DIAGNOSIS — R94.31: ICD-10-CM

## 2020-12-08 DIAGNOSIS — R93.89: ICD-10-CM

## 2020-12-08 PROCEDURE — 93306 TTE W/DOPPLER COMPLETE: CPT

## 2020-12-08 PROCEDURE — A9502 TC99M TETROFOSMIN: HCPCS

## 2020-12-08 PROCEDURE — 78452 HT MUSCLE IMAGE SPECT MULT: CPT

## 2020-12-08 PROCEDURE — 93017 CV STRESS TEST TRACING ONLY: CPT

## 2020-12-16 ENCOUNTER — HOSPITAL ENCOUNTER (OUTPATIENT)
Age: 55
End: 2020-12-16
Payer: COMMERCIAL

## 2020-12-16 DIAGNOSIS — E11.40: Primary | ICD-10-CM

## 2020-12-16 DIAGNOSIS — Z79.899: ICD-10-CM

## 2020-12-16 DIAGNOSIS — Z79.4: ICD-10-CM

## 2020-12-16 DIAGNOSIS — E55.9: ICD-10-CM

## 2020-12-16 LAB
25-OH VITAMIN D, TOTAL: 24.3 NG/ML (ref 30–100)
ALBUMIN LEVEL: 4.6 G/DL (ref 3.5–5)
ALBUMIN/GLOB SERPL: 1.7 {RATIO} (ref 1.1–1.8)
ALP ISO SERPL-ACNC: 109 U/L (ref 38–126)
ALT SERPLBLD-CCNC: 40 U/L (ref 12–78)
ANION GAP SERPL CALC-SCNC: 13.6 MEQ/L (ref 5–15)
AST SERPL QL: 32 U/L (ref 14–36)
BILIRUBIN,TOTAL: 0.6 MG/DL (ref 0.2–1.3)
BUN SERPL-MCNC: 16 MG/DL (ref 7–17)
CALCIUM SPEC-MCNC: 10.2 MG/DL (ref 8.4–10.2)
CHLORIDE SPEC-SCNC: 100 MMOL/L (ref 98–107)
CHOLEST SPEC-SCNC: 248 MG/DL (ref 140–200)
CO2 SERPL-SCNC: 24 MMOL/L (ref 22–30)
CREAT BLD-SCNC: 0.6 MG/DL (ref 0.52–1.04)
ESTIMATED GLOMERULAR FILT RATE: 104 ML/MIN (ref 60–?)
GFR (AFRICAN AMERICAN): 126 ML/MIN (ref 60–?)
GLOBULIN SER CALC-MCNC: 2.7 G/DL (ref 1.3–3.2)
GLUCOSE: 491 MG/DL (ref 74–100)
HBA1C MFR BLD: 13.5 % (ref 4–6)
HCT VFR BLD CALC: 55.2 % (ref 37–47)
HDLC SERPL-MCNC: 79 MG/DL (ref 40–60)
HGB BLD-MCNC: 17.7 G/DL (ref 12.2–16.2)
MCHC RBC-ENTMCNC: 32 G/DL (ref 31.8–35.4)
MCV RBC: 99.9 FL (ref 81–99)
MEAN CORPUSCULAR HEMOGLOBIN: 32 PG (ref 27–31.2)
PLATELET # BLD: 233 K/MM3 (ref 142–424)
POTASSIUM: 4.6 MMOL/L (ref 3.5–5.1)
PROT SERPL-MCNC: 7.3 G/DL (ref 6.3–8.2)
RBC # BLD AUTO: 5.52 M/MM3 (ref 4.2–5.4)
SODIUM SPEC-SCNC: 133 MMOL/L (ref 136–145)
T4 FREE SERPL-MCNC: 1.42 NG/DL (ref 0.78–2.19)
TRIGLYCERIDES: 171 MG/DL (ref 30–150)
TSH SERPL-ACNC: 0.49 UIU/ML (ref 0.47–4.68)
WBC # BLD AUTO: 7.9 K/MM3 (ref 4.8–10.8)

## 2020-12-16 PROCEDURE — 82306 VITAMIN D 25 HYDROXY: CPT

## 2020-12-16 PROCEDURE — 85025 COMPLETE CBC W/AUTO DIFF WBC: CPT

## 2020-12-16 PROCEDURE — 80061 LIPID PANEL: CPT

## 2020-12-16 PROCEDURE — 80053 COMPREHEN METABOLIC PANEL: CPT

## 2020-12-16 PROCEDURE — 84439 ASSAY OF FREE THYROXINE: CPT

## 2020-12-16 PROCEDURE — 83036 HEMOGLOBIN GLYCOSYLATED A1C: CPT

## 2020-12-16 PROCEDURE — 84443 ASSAY THYROID STIM HORMONE: CPT

## 2020-12-16 PROCEDURE — 82043 UR ALBUMIN QUANTITATIVE: CPT

## 2021-06-15 ENCOUNTER — HOSPITAL ENCOUNTER (OUTPATIENT)
Age: 56
End: 2021-06-15
Payer: COMMERCIAL

## 2021-06-15 DIAGNOSIS — R42: Primary | ICD-10-CM

## 2021-06-15 DIAGNOSIS — E11.9: ICD-10-CM

## 2021-06-15 DIAGNOSIS — F17.200: ICD-10-CM

## 2021-06-15 DIAGNOSIS — I25.10: ICD-10-CM

## 2021-06-15 DIAGNOSIS — Z79.4: ICD-10-CM

## 2021-06-15 PROCEDURE — 93270 REMOTE 30 DAY ECG REV/REPORT: CPT

## 2021-06-18 ENCOUNTER — HOSPITAL ENCOUNTER (OUTPATIENT)
Age: 56
End: 2021-06-18
Payer: COMMERCIAL

## 2021-06-18 DIAGNOSIS — F17.200: ICD-10-CM

## 2021-06-18 DIAGNOSIS — E11.9: ICD-10-CM

## 2021-06-18 DIAGNOSIS — I25.10: ICD-10-CM

## 2021-06-18 DIAGNOSIS — Z79.4: ICD-10-CM

## 2021-06-18 DIAGNOSIS — R42: Primary | ICD-10-CM

## 2021-06-18 PROCEDURE — 93880 EXTRACRANIAL BILAT STUDY: CPT

## 2021-07-01 ENCOUNTER — HOSPITAL ENCOUNTER (OUTPATIENT)
Age: 56
End: 2021-07-01
Payer: COMMERCIAL

## 2021-07-01 DIAGNOSIS — R51.9: Primary | ICD-10-CM

## 2021-07-01 DIAGNOSIS — R06.00: ICD-10-CM

## 2021-07-01 DIAGNOSIS — E04.1: ICD-10-CM

## 2021-07-01 DIAGNOSIS — Z80.8: ICD-10-CM

## 2021-07-01 PROCEDURE — 70551 MRI BRAIN STEM W/O DYE: CPT

## 2021-07-01 PROCEDURE — 94762 N-INVAS EAR/PLS OXIMTRY CONT: CPT

## 2021-07-01 PROCEDURE — 76536 US EXAM OF HEAD AND NECK: CPT

## 2021-07-09 ENCOUNTER — HOSPITAL ENCOUNTER (OUTPATIENT)
Age: 56
End: 2021-07-09
Payer: COMMERCIAL

## 2021-07-09 DIAGNOSIS — R42: ICD-10-CM

## 2021-07-09 DIAGNOSIS — E11.40: ICD-10-CM

## 2021-07-09 DIAGNOSIS — I10: ICD-10-CM

## 2021-07-09 DIAGNOSIS — R51.9: ICD-10-CM

## 2021-07-09 DIAGNOSIS — Z79.4: ICD-10-CM

## 2021-07-09 DIAGNOSIS — E78.5: ICD-10-CM

## 2021-07-09 LAB
ALBUMIN LEVEL: 4.7 G/DL (ref 3.5–5)
ALBUMIN/GLOB SERPL: 1.9 {RATIO} (ref 1.1–1.8)
ALP ISO SERPL-ACNC: 126 U/L (ref 38–126)
ALT SERPLBLD-CCNC: 48 U/L (ref 12–78)
ANION GAP SERPL CALC-SCNC: 13.6 MEQ/L (ref 5–15)
AST SERPL QL: 29 U/L (ref 14–36)
BILIRUBIN,TOTAL: 0.7 MG/DL (ref 0.2–1.3)
BUN SERPL-MCNC: 16 MG/DL (ref 7–17)
CALCIUM SPEC-MCNC: 9.8 MG/DL (ref 8.4–10.2)
CHLORIDE SPEC-SCNC: 101 MMOL/L (ref 98–107)
CO2 SERPL-SCNC: 26 MMOL/L (ref 22–30)
CREAT BLD-SCNC: 0.5 MG/DL (ref 0.52–1.04)
ESTIMATED GLOMERULAR FILT RATE: 128 ML/MIN (ref 60–?)
FOLATE: > 20 NG/ML
GFR (AFRICAN AMERICAN): 155 ML/MIN (ref 60–?)
GLOBULIN SER CALC-MCNC: 2.5 G/DL (ref 1.3–3.2)
GLUCOSE: 444 MG/DL (ref 74–100)
HBA1C MFR BLD: > 14 % (ref 4–6)
HCT VFR BLD CALC: 47.5 % (ref 37–47)
HGB BLD-MCNC: 15.8 G/DL (ref 12.2–16.2)
MCHC RBC-ENTMCNC: 33.3 G/DL (ref 31.8–35.4)
MCV RBC: 94.4 FL (ref 81–99)
MEAN CORPUSCULAR HEMOGLOBIN: 31.5 PG (ref 27–31.2)
PLATELET # BLD: 220 K/MM3 (ref 142–424)
POTASSIUM: 4.6 MMOL/L (ref 3.5–5.1)
PROT SERPL-MCNC: 7.2 G/DL (ref 6.3–8.2)
RBC # BLD AUTO: 5.04 M/MM3 (ref 4.2–5.4)
SODIUM SPEC-SCNC: 136 MMOL/L (ref 136–145)
TSH SERPL-ACNC: 1.05 UIU/ML (ref 0.47–4.68)
VITAMIN B12: 547 PG/ML (ref 239–931)
WBC # BLD AUTO: 7.7 K/MM3 (ref 4.8–10.8)

## 2021-07-09 PROCEDURE — 82746 ASSAY OF FOLIC ACID SERUM: CPT

## 2021-07-09 PROCEDURE — 86038 ANTINUCLEAR ANTIBODIES: CPT

## 2021-07-09 PROCEDURE — 85025 COMPLETE CBC W/AUTO DIFF WBC: CPT

## 2021-07-09 PROCEDURE — 36415 COLL VENOUS BLD VENIPUNCTURE: CPT

## 2021-07-09 PROCEDURE — 82607 VITAMIN B-12: CPT

## 2021-07-09 PROCEDURE — 84443 ASSAY THYROID STIM HORMONE: CPT

## 2021-07-09 PROCEDURE — 80053 COMPREHEN METABOLIC PANEL: CPT

## 2021-07-09 PROCEDURE — 83036 HEMOGLOBIN GLYCOSYLATED A1C: CPT

## 2021-07-16 LAB
ANA SER QL: NEGATIVE
ANTI-CENTROMERE B ABS CHARGE: (no result)
ANTI-DNA (DS) AB CHARGE: (no result)
ANTI-JO-1 CHARGE: (no result)
ANTICHROMATIN ABS CHARGE: (no result)
ANTISCLERODERMA-70 ABS CHARGE: (no result)
RNP ANTIBODIES CHARGE: (no result)
SJOGREN'S ANTI-SS-A AB CHARGE: (no result)
SJOGREN'S ANTI-SS-B AB CHARGE: (no result)
SMITH ANTIBODIES CHARGE: (no result)

## 2021-10-14 ENCOUNTER — HOSPITAL ENCOUNTER (OUTPATIENT)
Age: 56
End: 2021-10-14
Payer: COMMERCIAL

## 2021-10-14 DIAGNOSIS — R55: ICD-10-CM

## 2021-10-14 DIAGNOSIS — R42: Primary | ICD-10-CM

## 2021-10-14 PROCEDURE — 93270 REMOTE 30 DAY ECG REV/REPORT: CPT

## 2021-10-20 ENCOUNTER — HOSPITAL ENCOUNTER (OUTPATIENT)
Dept: HOSPITAL 22 - ER | Age: 56
Setting detail: OBSERVATION
LOS: 1 days | Discharge: HOME | End: 2021-10-21
Payer: COMMERCIAL

## 2021-10-20 VITALS
SYSTOLIC BLOOD PRESSURE: 79 MMHG | OXYGEN SATURATION: 95 % | RESPIRATION RATE: 22 BRPM | HEART RATE: 93 BPM | DIASTOLIC BLOOD PRESSURE: 48 MMHG

## 2021-10-20 VITALS
HEART RATE: 87 BPM | RESPIRATION RATE: 18 BRPM | OXYGEN SATURATION: 99 % | TEMPERATURE: 97.52 F | DIASTOLIC BLOOD PRESSURE: 70 MMHG | SYSTOLIC BLOOD PRESSURE: 142 MMHG

## 2021-10-20 VITALS
OXYGEN SATURATION: 100 % | DIASTOLIC BLOOD PRESSURE: 51 MMHG | HEART RATE: 101 BPM | SYSTOLIC BLOOD PRESSURE: 90 MMHG | RESPIRATION RATE: 24 BRPM

## 2021-10-20 VITALS
RESPIRATION RATE: 20 BRPM | DIASTOLIC BLOOD PRESSURE: 66 MMHG | OXYGEN SATURATION: 100 % | HEART RATE: 96 BPM | SYSTOLIC BLOOD PRESSURE: 107 MMHG

## 2021-10-20 VITALS
DIASTOLIC BLOOD PRESSURE: 74 MMHG | RESPIRATION RATE: 17 BRPM | SYSTOLIC BLOOD PRESSURE: 160 MMHG | OXYGEN SATURATION: 99 % | HEART RATE: 89 BPM

## 2021-10-20 VITALS
DIASTOLIC BLOOD PRESSURE: 59 MMHG | RESPIRATION RATE: 20 BRPM | SYSTOLIC BLOOD PRESSURE: 122 MMHG | HEART RATE: 88 BPM | OXYGEN SATURATION: 99 %

## 2021-10-20 VITALS
SYSTOLIC BLOOD PRESSURE: 121 MMHG | RESPIRATION RATE: 18 BRPM | OXYGEN SATURATION: 100 % | HEART RATE: 93 BPM | DIASTOLIC BLOOD PRESSURE: 60 MMHG

## 2021-10-20 VITALS
OXYGEN SATURATION: 98 % | DIASTOLIC BLOOD PRESSURE: 70 MMHG | SYSTOLIC BLOOD PRESSURE: 142 MMHG | HEART RATE: 92 BPM | RESPIRATION RATE: 18 BRPM

## 2021-10-20 VITALS
OXYGEN SATURATION: 100 % | HEART RATE: 100 BPM | SYSTOLIC BLOOD PRESSURE: 142 MMHG | DIASTOLIC BLOOD PRESSURE: 64 MMHG | TEMPERATURE: 97.88 F | RESPIRATION RATE: 18 BRPM

## 2021-10-20 VITALS
DIASTOLIC BLOOD PRESSURE: 76 MMHG | OXYGEN SATURATION: 99 % | RESPIRATION RATE: 15 BRPM | SYSTOLIC BLOOD PRESSURE: 130 MMHG | HEART RATE: 96 BPM

## 2021-10-20 VITALS
RESPIRATION RATE: 16 BRPM | SYSTOLIC BLOOD PRESSURE: 164 MMHG | HEART RATE: 92 BPM | OXYGEN SATURATION: 100 % | DIASTOLIC BLOOD PRESSURE: 72 MMHG

## 2021-10-20 VITALS
HEART RATE: 91 BPM | OXYGEN SATURATION: 100 % | RESPIRATION RATE: 15 BRPM | SYSTOLIC BLOOD PRESSURE: 142 MMHG | DIASTOLIC BLOOD PRESSURE: 67 MMHG

## 2021-10-20 VITALS
DIASTOLIC BLOOD PRESSURE: 70 MMHG | TEMPERATURE: 97.52 F | SYSTOLIC BLOOD PRESSURE: 142 MMHG | OXYGEN SATURATION: 98 % | HEART RATE: 92 BPM | RESPIRATION RATE: 18 BRPM

## 2021-10-20 VITALS
BODY MASS INDEX: 19.3 KG/M2 | BODY MASS INDEX: 18.6 KG/M2 | TEMPERATURE: 97.6 F | BODY MASS INDEX: 18.1 KG/M2 | RESPIRATION RATE: 18 BRPM | DIASTOLIC BLOOD PRESSURE: 64 MMHG | SYSTOLIC BLOOD PRESSURE: 109 MMHG | HEART RATE: 101 BPM | OXYGEN SATURATION: 100 %

## 2021-10-20 VITALS — HEART RATE: 101 BPM | DIASTOLIC BLOOD PRESSURE: 51 MMHG | SYSTOLIC BLOOD PRESSURE: 90 MMHG

## 2021-10-20 VITALS
HEART RATE: 95 BPM | RESPIRATION RATE: 18 BRPM | DIASTOLIC BLOOD PRESSURE: 67 MMHG | SYSTOLIC BLOOD PRESSURE: 121 MMHG | OXYGEN SATURATION: 98 %

## 2021-10-20 DIAGNOSIS — E11.65: ICD-10-CM

## 2021-10-20 DIAGNOSIS — I45.10: ICD-10-CM

## 2021-10-20 DIAGNOSIS — Z20.822: ICD-10-CM

## 2021-10-20 DIAGNOSIS — Z79.899: ICD-10-CM

## 2021-10-20 DIAGNOSIS — J44.9: ICD-10-CM

## 2021-10-20 DIAGNOSIS — G43.909: ICD-10-CM

## 2021-10-20 DIAGNOSIS — E11.42: ICD-10-CM

## 2021-10-20 DIAGNOSIS — R55: Primary | ICD-10-CM

## 2021-10-20 DIAGNOSIS — K21.9: ICD-10-CM

## 2021-10-20 DIAGNOSIS — F17.210: ICD-10-CM

## 2021-10-20 DIAGNOSIS — I95.1: ICD-10-CM

## 2021-10-20 LAB
ALBUMIN LEVEL: 4.2 G/DL (ref 3.5–5)
ALBUMIN/GLOB SERPL: 1.6 {RATIO} (ref 1.1–1.8)
ALP ISO SERPL-ACNC: 77 U/L (ref 38–126)
ALT SERPLBLD-CCNC: 16 U/L (ref 12–78)
ANION GAP SERPL CALC-SCNC: 19.5 MEQ/L (ref 5–15)
AST SERPL QL: 23 U/L (ref 14–36)
BILIRUBIN,TOTAL: 1.2 MG/DL (ref 0.2–1.3)
BUN SERPL-MCNC: 24 MG/DL (ref 7–17)
CALCIUM SPEC-MCNC: 9.7 MG/DL (ref 8.4–10.2)
CHLORIDE SPEC-SCNC: 91 MMOL/L (ref 98–107)
CO2 SERPL-SCNC: 22 MMOL/L (ref 22–30)
CREAT BLD-SCNC: 0.7 MG/DL (ref 0.52–1.04)
CREATININE CLEARANCE ESTIMATED: 73 ML/MIN (ref 50–200)
ESTIMATED GLOMERULAR FILT RATE: 87 ML/MIN (ref 60–?)
GFR (AFRICAN AMERICAN): 105 ML/MIN (ref 60–?)
GLOBULIN SER CALC-MCNC: 2.6 G/DL (ref 1.3–3.2)
GLUCOSE BLDC GLUCOMTR-MCNC: 362 MG/DL (ref 70–110)
GLUCOSE: 375 MG/DL (ref 74–100)
HCT VFR BLD CALC: 48.3 % (ref 37–47)
HGB BLD-MCNC: 15.9 G/DL (ref 12.2–16.2)
MCHC RBC-ENTMCNC: 32.9 G/DL (ref 31.8–35.4)
MCV RBC: 97.3 FL (ref 81–99)
MEAN CORPUSCULAR HEMOGLOBIN: 32 PG (ref 27–31.2)
PLATELET # BLD: 296 K/MM3 (ref 142–424)
POTASSIUM: 3.5 MMOL/L (ref 3.5–5.1)
PROT SERPL-MCNC: 6.8 G/DL (ref 6.3–8.2)
RBC # BLD AUTO: 4.96 M/MM3 (ref 4.2–5.4)
SODIUM SPEC-SCNC: 129 MMOL/L (ref 136–145)
TROPONIN I: < 0.01 NG/ML (ref 0–0.03)
WBC # BLD AUTO: 8.2 K/MM3 (ref 4.8–10.8)

## 2021-10-20 PROCEDURE — 96365 THER/PROPH/DIAG IV INF INIT: CPT

## 2021-10-20 PROCEDURE — 80053 COMPREHEN METABOLIC PANEL: CPT

## 2021-10-20 PROCEDURE — U0003 INFECTIOUS AGENT DETECTION BY NUCLEIC ACID (DNA OR RNA); SEVERE ACUTE RESPIRATORY SYNDROME CORONAVIRUS 2 (SARS-COV-2) (CORONAVIRUS DISEASE [COVID-19]), AMPLIFIED PROBE TECHNIQUE, MAKING USE OF HIGH THROUGHPUT TECHNOLOGIES AS DESCRIBED BY CMS-2020-01-R: HCPCS

## 2021-10-20 PROCEDURE — C9803 HOPD COVID-19 SPEC COLLECT: HCPCS

## 2021-10-20 PROCEDURE — 33285 INSJ SUBQ CAR RHYTHM MNTR: CPT

## 2021-10-20 PROCEDURE — 93005 ELECTROCARDIOGRAM TRACING: CPT

## 2021-10-20 PROCEDURE — 99285 EMERGENCY DEPT VISIT HI MDM: CPT

## 2021-10-20 PROCEDURE — U0005 INFEC AGEN DETEC AMPLI PROBE: HCPCS

## 2021-10-20 PROCEDURE — 85025 COMPLETE CBC W/AUTO DIFF WBC: CPT

## 2021-10-20 PROCEDURE — G0378 HOSPITAL OBSERVATION PER HR: HCPCS

## 2021-10-20 PROCEDURE — 84484 ASSAY OF TROPONIN QUANT: CPT

## 2021-10-20 PROCEDURE — 82962 GLUCOSE BLOOD TEST: CPT

## 2021-10-21 VITALS
RESPIRATION RATE: 20 BRPM | SYSTOLIC BLOOD PRESSURE: 145 MMHG | TEMPERATURE: 98.24 F | HEART RATE: 99 BPM | DIASTOLIC BLOOD PRESSURE: 71 MMHG | OXYGEN SATURATION: 100 %

## 2021-10-21 VITALS — OXYGEN SATURATION: 99 %

## 2021-10-21 VITALS
SYSTOLIC BLOOD PRESSURE: 80 MMHG | HEART RATE: 97 BPM | OXYGEN SATURATION: 96 % | DIASTOLIC BLOOD PRESSURE: 61 MMHG | TEMPERATURE: 98.24 F | RESPIRATION RATE: 19 BRPM

## 2021-10-21 VITALS
SYSTOLIC BLOOD PRESSURE: 130 MMHG | HEART RATE: 83 BPM | DIASTOLIC BLOOD PRESSURE: 70 MMHG | OXYGEN SATURATION: 95 % | TEMPERATURE: 98.42 F | RESPIRATION RATE: 22 BRPM

## 2021-10-21 VITALS — HEART RATE: 90 BPM

## 2021-10-21 LAB — GLUCOSE BLDC GLUCOMTR-MCNC: 210 MG/DL (ref 70–110)

## 2021-10-27 ENCOUNTER — HOSPITAL ENCOUNTER (OUTPATIENT)
Age: 56
End: 2021-10-27
Payer: COMMERCIAL

## 2021-10-27 DIAGNOSIS — I10: Primary | ICD-10-CM

## 2021-10-27 DIAGNOSIS — E78.5: ICD-10-CM

## 2021-10-27 DIAGNOSIS — E11.65: ICD-10-CM

## 2021-10-27 LAB
25-OH VITAMIN D, TOTAL: 33.1 NG/ML (ref 30–100)
ALBUMIN LEVEL: 4.5 G/DL (ref 3.5–5)
ALBUMIN/GLOB SERPL: 1.6 {RATIO} (ref 1.1–1.8)
ALP ISO SERPL-ACNC: 82 U/L (ref 38–126)
ALT SERPLBLD-CCNC: 36 U/L (ref 12–78)
ANION GAP SERPL CALC-SCNC: 16.1 MEQ/L (ref 5–15)
AST SERPL QL: 41 U/L (ref 14–36)
BILIRUBIN,TOTAL: 0.2 MG/DL (ref 0.2–1.3)
BUN SERPL-MCNC: 8 MG/DL (ref 7–17)
CALCIUM SPEC-MCNC: 10 MG/DL (ref 8.4–10.2)
CHLORIDE SPEC-SCNC: 98 MMOL/L (ref 98–107)
CHOLEST SPEC-SCNC: 207 MG/DL (ref 140–200)
CO2 SERPL-SCNC: 28 MMOL/L (ref 22–30)
CREAT BLD-SCNC: 0.4 MG/DL (ref 0.52–1.04)
ESTIMATED GLOMERULAR FILT RATE: 165 ML/MIN (ref 60–?)
GFR (AFRICAN AMERICAN): 200 ML/MIN (ref 60–?)
GLOBULIN SER CALC-MCNC: 2.8 G/DL (ref 1.3–3.2)
GLUCOSE: 486 MG/DL (ref 74–100)
HBA1C MFR BLD: 10.6 % (ref 4–6)
HCT VFR BLD CALC: 51.9 % (ref 37–47)
HDLC SERPL-MCNC: 84 MG/DL (ref 40–60)
HGB BLD-MCNC: 16.3 G/DL (ref 12.2–16.2)
MCHC RBC-ENTMCNC: 31.4 G/DL (ref 31.8–35.4)
MCV RBC: 102.4 FL (ref 81–99)
MEAN CORPUSCULAR HEMOGLOBIN: 32.1 PG (ref 27–31.2)
PLATELET # BLD: 276 K/MM3 (ref 142–424)
POTASSIUM: 4.1 MMOL/L (ref 3.5–5.1)
PROT SERPL-MCNC: 7.3 G/DL (ref 6.3–8.2)
RBC # BLD AUTO: 5.07 M/MM3 (ref 4.2–5.4)
SODIUM SPEC-SCNC: 138 MMOL/L (ref 136–145)
T4 (THYROXINE): 13.3 UG/DL (ref 5.53–11)
TRIGLYCERIDES: 145 MG/DL (ref 30–150)
TSH SERPL-ACNC: 0.21 UIU/ML (ref 0.47–4.68)
WBC # BLD AUTO: 8.9 K/MM3 (ref 4.8–10.8)

## 2021-10-27 PROCEDURE — 84436 ASSAY OF TOTAL THYROXINE: CPT

## 2021-10-27 PROCEDURE — 83036 HEMOGLOBIN GLYCOSYLATED A1C: CPT

## 2021-10-27 PROCEDURE — 82306 VITAMIN D 25 HYDROXY: CPT

## 2021-10-27 PROCEDURE — 85025 COMPLETE CBC W/AUTO DIFF WBC: CPT

## 2021-10-27 PROCEDURE — 80053 COMPREHEN METABOLIC PANEL: CPT

## 2021-10-27 PROCEDURE — 84443 ASSAY THYROID STIM HORMONE: CPT

## 2021-10-27 PROCEDURE — 80061 LIPID PANEL: CPT

## 2021-12-15 ENCOUNTER — HOSPITAL ENCOUNTER (OUTPATIENT)
Age: 56
End: 2021-12-15
Payer: COMMERCIAL

## 2021-12-15 VITALS
SYSTOLIC BLOOD PRESSURE: 125 MMHG | TEMPERATURE: 97.8 F | HEART RATE: 85 BPM | OXYGEN SATURATION: 98 % | RESPIRATION RATE: 18 BRPM | DIASTOLIC BLOOD PRESSURE: 67 MMHG

## 2021-12-15 DIAGNOSIS — R55: Primary | ICD-10-CM

## 2021-12-15 PROCEDURE — 70551 MRI BRAIN STEM W/O DYE: CPT

## 2021-12-15 PROCEDURE — 93660 TILT TABLE EVALUATION: CPT

## 2022-03-02 ENCOUNTER — HOSPITAL ENCOUNTER (OUTPATIENT)
Age: 57
End: 2022-03-02
Payer: COMMERCIAL

## 2022-03-02 DIAGNOSIS — Z79.4: ICD-10-CM

## 2022-03-02 DIAGNOSIS — E11.65: Primary | ICD-10-CM

## 2022-03-02 LAB
ANION GAP SERPL CALC-SCNC: 13.1 MEQ/L (ref 5–15)
BUN SERPL-MCNC: 20 MG/DL (ref 7–17)
CALCIUM SPEC-MCNC: 9 MG/DL (ref 8.4–10.2)
CHLORIDE SPEC-SCNC: 102 MMOL/L (ref 98–107)
CO2 SERPL-SCNC: 23 MMOL/L (ref 22–30)
CREAT BLD-SCNC: 0.7 MG/DL (ref 0.52–1.04)
ESTIMATED GLOMERULAR FILT RATE: 87 ML/MIN (ref 60–?)
GFR (AFRICAN AMERICAN): 105 ML/MIN (ref 60–?)
GLUCOSE: 298 MG/DL (ref 74–100)
HBA1C MFR BLD: 10.1 % (ref 4–6)
POTASSIUM: 4.1 MMOL/L (ref 3.5–5.1)
SODIUM SPEC-SCNC: 134 MMOL/L (ref 136–145)
TSH SERPL-ACNC: 0.16 UIU/ML (ref 0.47–4.68)

## 2022-03-02 PROCEDURE — 80048 BASIC METABOLIC PNL TOTAL CA: CPT

## 2022-03-02 PROCEDURE — 83036 HEMOGLOBIN GLYCOSYLATED A1C: CPT

## 2022-03-02 PROCEDURE — 84443 ASSAY THYROID STIM HORMONE: CPT

## 2022-03-29 ENCOUNTER — HOSPITAL ENCOUNTER (OUTPATIENT)
Age: 57
End: 2022-03-29
Payer: COMMERCIAL

## 2022-03-29 DIAGNOSIS — I10: ICD-10-CM

## 2022-03-29 DIAGNOSIS — Z11.52: ICD-10-CM

## 2022-03-29 DIAGNOSIS — I63.9: ICD-10-CM

## 2022-03-29 DIAGNOSIS — R94.31: ICD-10-CM

## 2022-03-29 DIAGNOSIS — I20.8: ICD-10-CM

## 2022-03-29 DIAGNOSIS — I95.9: ICD-10-CM

## 2022-03-29 DIAGNOSIS — Z01.812: Primary | ICD-10-CM

## 2022-03-29 DIAGNOSIS — F17.200: ICD-10-CM

## 2022-03-29 DIAGNOSIS — E78.2: ICD-10-CM

## 2022-03-29 DIAGNOSIS — R79.89: ICD-10-CM

## 2022-03-29 LAB
HCT VFR BLD CALC: 45.8 % (ref 37–47)
HGB BLD-MCNC: 15.1 G/DL (ref 12.2–16.2)
MCHC RBC-ENTMCNC: 32.9 G/DL (ref 31.8–35.4)
MCV RBC: 97.8 FL (ref 81–99)
MEAN CORPUSCULAR HEMOGLOBIN: 32.2 PG (ref 27–31.2)
PLATELET # BLD: 219 K/MM3 (ref 142–424)
RBC # BLD AUTO: 4.69 M/MM3 (ref 4.2–5.4)
TSH SERPL-ACNC: 0.43 UIU/ML (ref 0.47–4.68)
WBC # BLD AUTO: 7.6 K/MM3 (ref 4.8–10.8)

## 2022-03-29 PROCEDURE — U0005 INFEC AGEN DETEC AMPLI PROBE: HCPCS

## 2022-03-29 PROCEDURE — 36415 COLL VENOUS BLD VENIPUNCTURE: CPT

## 2022-03-29 PROCEDURE — U0003 INFECTIOUS AGENT DETECTION BY NUCLEIC ACID (DNA OR RNA); SEVERE ACUTE RESPIRATORY SYNDROME CORONAVIRUS 2 (SARS-COV-2) (CORONAVIRUS DISEASE [COVID-19]), AMPLIFIED PROBE TECHNIQUE, MAKING USE OF HIGH THROUGHPUT TECHNOLOGIES AS DESCRIBED BY CMS-2020-01-R: HCPCS

## 2022-03-29 PROCEDURE — 85025 COMPLETE CBC W/AUTO DIFF WBC: CPT

## 2022-03-29 PROCEDURE — 84443 ASSAY THYROID STIM HORMONE: CPT

## 2022-03-29 PROCEDURE — C9803 HOPD COVID-19 SPEC COLLECT: HCPCS

## 2022-03-31 ENCOUNTER — HOSPITAL ENCOUNTER (OUTPATIENT)
Dept: HOSPITAL 22 - CATHLAB | Age: 57
Discharge: HOME | End: 2022-03-31
Payer: COMMERCIAL

## 2022-03-31 VITALS
RESPIRATION RATE: 16 BRPM | OXYGEN SATURATION: 100 % | HEART RATE: 79 BPM | DIASTOLIC BLOOD PRESSURE: 68 MMHG | SYSTOLIC BLOOD PRESSURE: 133 MMHG | TEMPERATURE: 98.2 F

## 2022-03-31 VITALS
RESPIRATION RATE: 18 BRPM | HEART RATE: 83 BPM | DIASTOLIC BLOOD PRESSURE: 88 MMHG | OXYGEN SATURATION: 100 % | SYSTOLIC BLOOD PRESSURE: 162 MMHG

## 2022-03-31 VITALS
HEART RATE: 80 BPM | DIASTOLIC BLOOD PRESSURE: 64 MMHG | SYSTOLIC BLOOD PRESSURE: 113 MMHG | RESPIRATION RATE: 18 BRPM | OXYGEN SATURATION: 97 %

## 2022-03-31 VITALS
HEART RATE: 62 BPM | OXYGEN SATURATION: 97 % | RESPIRATION RATE: 18 BRPM | DIASTOLIC BLOOD PRESSURE: 76 MMHG | SYSTOLIC BLOOD PRESSURE: 119 MMHG

## 2022-03-31 VITALS
OXYGEN SATURATION: 97 % | RESPIRATION RATE: 18 BRPM | HEART RATE: 78 BPM | DIASTOLIC BLOOD PRESSURE: 77 MMHG | SYSTOLIC BLOOD PRESSURE: 158 MMHG

## 2022-03-31 VITALS
DIASTOLIC BLOOD PRESSURE: 74 MMHG | RESPIRATION RATE: 18 BRPM | SYSTOLIC BLOOD PRESSURE: 114 MMHG | HEART RATE: 63 BPM | OXYGEN SATURATION: 98 %

## 2022-03-31 VITALS
SYSTOLIC BLOOD PRESSURE: 151 MMHG | HEART RATE: 69 BPM | OXYGEN SATURATION: 97 % | DIASTOLIC BLOOD PRESSURE: 81 MMHG | RESPIRATION RATE: 18 BRPM

## 2022-03-31 VITALS
OXYGEN SATURATION: 99 % | HEART RATE: 70 BPM | RESPIRATION RATE: 16 BRPM | SYSTOLIC BLOOD PRESSURE: 164 MMHG | DIASTOLIC BLOOD PRESSURE: 70 MMHG

## 2022-03-31 VITALS
OXYGEN SATURATION: 97 % | HEART RATE: 64 BPM | DIASTOLIC BLOOD PRESSURE: 72 MMHG | RESPIRATION RATE: 18 BRPM | SYSTOLIC BLOOD PRESSURE: 122 MMHG

## 2022-03-31 VITALS — HEART RATE: 82 BPM

## 2022-03-31 VITALS
RESPIRATION RATE: 17 BRPM | DIASTOLIC BLOOD PRESSURE: 89 MMHG | HEART RATE: 81 BPM | OXYGEN SATURATION: 99 % | SYSTOLIC BLOOD PRESSURE: 162 MMHG

## 2022-03-31 VITALS
HEART RATE: 70 BPM | OXYGEN SATURATION: 100 % | SYSTOLIC BLOOD PRESSURE: 151 MMHG | RESPIRATION RATE: 18 BRPM | DIASTOLIC BLOOD PRESSURE: 71 MMHG

## 2022-03-31 VITALS
HEART RATE: 71 BPM | OXYGEN SATURATION: 99 % | DIASTOLIC BLOOD PRESSURE: 68 MMHG | SYSTOLIC BLOOD PRESSURE: 137 MMHG | RESPIRATION RATE: 18 BRPM

## 2022-03-31 VITALS
OXYGEN SATURATION: 98 % | RESPIRATION RATE: 18 BRPM | SYSTOLIC BLOOD PRESSURE: 161 MMHG | DIASTOLIC BLOOD PRESSURE: 84 MMHG | HEART RATE: 80 BPM

## 2022-03-31 VITALS
HEART RATE: 75 BPM | SYSTOLIC BLOOD PRESSURE: 137 MMHG | OXYGEN SATURATION: 100 % | DIASTOLIC BLOOD PRESSURE: 73 MMHG | TEMPERATURE: 98.06 F | RESPIRATION RATE: 18 BRPM

## 2022-03-31 VITALS — HEART RATE: 77 BPM

## 2022-03-31 VITALS — BODY MASS INDEX: 20.7 KG/M2

## 2022-03-31 DIAGNOSIS — L72.3: ICD-10-CM

## 2022-03-31 DIAGNOSIS — I25.118: Primary | ICD-10-CM

## 2022-03-31 DIAGNOSIS — E11.9: ICD-10-CM

## 2022-03-31 DIAGNOSIS — I25.83: ICD-10-CM

## 2022-03-31 DIAGNOSIS — R55: ICD-10-CM

## 2022-03-31 DIAGNOSIS — Z79.4: ICD-10-CM

## 2022-03-31 DIAGNOSIS — F17.210: ICD-10-CM

## 2022-03-31 DIAGNOSIS — I95.9: ICD-10-CM

## 2022-03-31 DIAGNOSIS — I10: ICD-10-CM

## 2022-03-31 DIAGNOSIS — Z79.899: ICD-10-CM

## 2022-03-31 DIAGNOSIS — R94.31: ICD-10-CM

## 2022-03-31 DIAGNOSIS — J44.9: ICD-10-CM

## 2022-03-31 DIAGNOSIS — E78.2: ICD-10-CM

## 2022-03-31 LAB
ANION GAP SERPL CALC-SCNC: 11.2 MEQ/L (ref 5–15)
ANION GAP SERPL CALC-SCNC: 12.2 MEQ/L (ref 5–15)
BUN SERPL-MCNC: 15 MG/DL (ref 7–17)
BUN SERPL-MCNC: 21 MG/DL (ref 7–17)
CALCIUM SPEC-MCNC: 8.9 MG/DL (ref 8.4–10.2)
CALCIUM SPEC-MCNC: 9.1 MG/DL (ref 8.4–10.2)
CATHL ACTIVATED CLOTTING TIME: > 400 SEC (ref 74–125)
CHLORIDE SPEC-SCNC: 103 MMOL/L (ref 98–107)
CHLORIDE SPEC-SCNC: 104 MMOL/L (ref 98–107)
CO2 SERPL-SCNC: 24 MMOL/L (ref 22–30)
CO2 SERPL-SCNC: 27 MMOL/L (ref 22–30)
CREAT BLD-SCNC: 0.5 MG/DL (ref 0.52–1.04)
CREAT BLD-SCNC: 0.5 MG/DL (ref 0.52–1.04)
CREATININE CLEARANCE ESTIMATED: 109 ML/MIN (ref 50–200)
CREATININE CLEARANCE ESTIMATED: 109 ML/MIN (ref 50–200)
ESTIMATED GLOMERULAR FILT RATE: 128 ML/MIN (ref 60–?)
ESTIMATED GLOMERULAR FILT RATE: 128 ML/MIN (ref 60–?)
GFR (AFRICAN AMERICAN): 154 ML/MIN (ref 60–?)
GFR (AFRICAN AMERICAN): 154 ML/MIN (ref 60–?)
GLUCOSE: 245 MG/DL (ref 74–100)
GLUCOSE: 245 MG/DL (ref 74–100)
POTASSIUM: 4.2 MMOL/L (ref 3.5–5.1)
POTASSIUM: 4.2 MMOL/L (ref 3.5–5.1)
SODIUM SPEC-SCNC: 136 MMOL/L (ref 136–145)
SODIUM SPEC-SCNC: 137 MMOL/L (ref 136–145)

## 2022-03-31 PROCEDURE — 99152 MOD SED SAME PHYS/QHP 5/>YRS: CPT

## 2022-03-31 PROCEDURE — 85347 COAGULATION TIME ACTIVATED: CPT

## 2022-03-31 PROCEDURE — C1769 GUIDE WIRE: HCPCS

## 2022-03-31 PROCEDURE — C1760 CLOSURE DEV, VASC: HCPCS

## 2022-03-31 PROCEDURE — 92928 PRQ TCAT PLMT NTRAC ST 1 LES: CPT

## 2022-03-31 PROCEDURE — C1876 STENT, NON-COA/NON-COV W/DEL: HCPCS

## 2022-03-31 PROCEDURE — 99153 MOD SED SAME PHYS/QHP EA: CPT

## 2022-03-31 PROCEDURE — 93458 L HRT ARTERY/VENTRICLE ANGIO: CPT

## 2022-03-31 PROCEDURE — C1725 CATH, TRANSLUMIN NON-LASER: HCPCS

## 2022-03-31 PROCEDURE — 92978 ENDOLUMINL IVUS OCT C 1ST: CPT

## 2022-03-31 PROCEDURE — C9600 PERC DRUG-EL COR STENT SING: HCPCS

## 2022-03-31 PROCEDURE — 80048 BASIC METABOLIC PNL TOTAL CA: CPT

## 2022-04-07 ENCOUNTER — HOSPITAL ENCOUNTER (OUTPATIENT)
Age: 57
End: 2022-04-07
Payer: COMMERCIAL

## 2022-04-07 DIAGNOSIS — R55: ICD-10-CM

## 2022-04-07 DIAGNOSIS — I25.10: Primary | ICD-10-CM

## 2022-04-07 DIAGNOSIS — R94.31: ICD-10-CM

## 2022-04-07 DIAGNOSIS — E78.2: ICD-10-CM

## 2022-04-07 DIAGNOSIS — I10: ICD-10-CM

## 2022-04-07 DIAGNOSIS — I72.9: ICD-10-CM

## 2022-04-07 PROCEDURE — 93931 UPPER EXTREMITY STUDY: CPT

## 2022-06-23 ENCOUNTER — HOSPITAL ENCOUNTER (OUTPATIENT)
Age: 57
End: 2022-06-23
Payer: COMMERCIAL

## 2022-06-23 DIAGNOSIS — E04.1: Primary | ICD-10-CM

## 2022-06-23 PROCEDURE — 76536 US EXAM OF HEAD AND NECK: CPT

## 2022-09-09 ENCOUNTER — HOSPITAL ENCOUNTER (OUTPATIENT)
Age: 57
End: 2022-09-09
Payer: COMMERCIAL

## 2022-09-09 DIAGNOSIS — R29.818: Primary | ICD-10-CM

## 2022-09-09 PROCEDURE — 70551 MRI BRAIN STEM W/O DYE: CPT

## 2022-10-17 ENCOUNTER — HOSPITAL ENCOUNTER (OUTPATIENT)
Age: 57
End: 2022-10-17
Payer: COMMERCIAL

## 2022-10-17 DIAGNOSIS — E55.9: ICD-10-CM

## 2022-10-17 DIAGNOSIS — E03.9: Primary | ICD-10-CM

## 2022-10-17 DIAGNOSIS — R73.09: ICD-10-CM

## 2022-10-17 LAB
25-OH VITAMIN D, TOTAL: 18.1 NG/ML (ref 30–100)
ALBUMIN LEVEL: 3.9 G/DL (ref 3.5–5)
ALBUMIN/GLOB SERPL: 1.5 {RATIO} (ref 1.1–1.8)
ALP ISO SERPL-ACNC: 109 U/L (ref 38–126)
ALT SERPLBLD-CCNC: 22 U/L (ref 12–78)
ANION GAP SERPL CALC-SCNC: 12 MEQ/L (ref 5–15)
AST SERPL QL: 27 U/L (ref 14–36)
BILIRUBIN,TOTAL: 0.5 MG/DL (ref 0.2–1.3)
BUN SERPL-MCNC: 15 MG/DL (ref 7–17)
CALCIUM SPEC-MCNC: 9.2 MG/DL (ref 8.4–10.2)
CHLORIDE SPEC-SCNC: 102 MMOL/L (ref 98–107)
CHOLEST SPEC-SCNC: 211 MG/DL (ref 140–200)
CO2 SERPL-SCNC: 29 MMOL/L (ref 22–30)
CREAT BLD-SCNC: 0.6 MG/DL (ref 0.52–1.04)
ESTIMATED GLOMERULAR FILT RATE: 103 ML/MIN (ref 60–?)
GFR (AFRICAN AMERICAN): 125 ML/MIN (ref 60–?)
GLOBULIN SER CALC-MCNC: 2.6 G/DL (ref 1.3–3.2)
GLUCOSE: 196 MG/DL (ref 74–100)
HBA1C MFR BLD: 9.9 % (ref 4–6)
HCT VFR BLD CALC: 46.8 % (ref 37–47)
HDLC SERPL-MCNC: 64 MG/DL (ref 40–60)
HGB BLD-MCNC: 15.2 G/DL (ref 12.2–16.2)
MCHC RBC-ENTMCNC: 32.4 G/DL (ref 31.8–35.4)
MCV RBC: 98.8 FL (ref 81–99)
MEAN CORPUSCULAR HEMOGLOBIN: 32 PG (ref 27–31.2)
PLATELET # BLD: 247 K/MM3 (ref 142–424)
POTASSIUM: 4 MMOL/L (ref 3.5–5.1)
PROT SERPL-MCNC: 6.5 G/DL (ref 6.3–8.2)
RBC # BLD AUTO: 4.74 M/MM3 (ref 4.2–5.4)
SODIUM SPEC-SCNC: 139 MMOL/L (ref 136–145)
TRIGLYCERIDES: 114 MG/DL (ref 30–150)
TSH SERPL-ACNC: 0.61 UIU/ML (ref 0.47–4.68)
WBC # BLD AUTO: 11.7 K/MM3 (ref 4.8–10.8)

## 2022-10-17 PROCEDURE — 82306 VITAMIN D 25 HYDROXY: CPT

## 2022-10-17 PROCEDURE — 83036 HEMOGLOBIN GLYCOSYLATED A1C: CPT

## 2022-10-17 PROCEDURE — 80061 LIPID PANEL: CPT

## 2022-10-17 PROCEDURE — 84443 ASSAY THYROID STIM HORMONE: CPT

## 2022-10-17 PROCEDURE — 82570 ASSAY OF URINE CREATININE: CPT

## 2022-10-17 PROCEDURE — 82043 UR ALBUMIN QUANTITATIVE: CPT

## 2022-10-17 PROCEDURE — 80053 COMPREHEN METABOLIC PANEL: CPT

## 2022-10-17 PROCEDURE — 85025 COMPLETE CBC W/AUTO DIFF WBC: CPT

## 2023-07-25 ENCOUNTER — HOSPITAL ENCOUNTER (OUTPATIENT)
Age: 58
End: 2023-07-25
Payer: COMMERCIAL

## 2023-07-25 DIAGNOSIS — Z79.4: ICD-10-CM

## 2023-07-25 DIAGNOSIS — Z79.899: ICD-10-CM

## 2023-07-25 DIAGNOSIS — E55.9: ICD-10-CM

## 2023-07-25 DIAGNOSIS — E11.9: Primary | ICD-10-CM

## 2023-07-25 LAB
25-OH VITAMIN D, TOTAL: 27 NG/ML (ref 30–100)
ALBUMIN LEVEL: 4.1 G/DL (ref 3.5–5)
ALBUMIN/GLOB SERPL: 1.6 {RATIO} (ref 1.1–1.8)
ALP ISO SERPL-ACNC: 105 U/L (ref 38–126)
ALT SERPLBLD-CCNC: 23 U/L (ref 12–78)
ANION GAP SERPL CALC-SCNC: 11.2 MEQ/L (ref 5–15)
AST SERPL QL: 22 U/L (ref 14–36)
BILIRUBIN,TOTAL: 0.4 MG/DL (ref 0.2–1.3)
BUN SERPL-MCNC: 18 MG/DL (ref 7–17)
CALCIUM SPEC-MCNC: 9.7 MG/DL (ref 8.4–10.2)
CHLORIDE SPEC-SCNC: 104 MMOL/L (ref 98–107)
CHOLEST SPEC-SCNC: 225 MG/DL (ref 140–200)
CO2 SERPL-SCNC: 29 MMOL/L (ref 22–30)
CREAT BLD-SCNC: 0.6 MG/DL (ref 0.52–1.04)
ESTIMATED GLOMERULAR FILT RATE: 103 ML/MIN (ref 60–?)
GFR (AFRICAN AMERICAN): 125 ML/MIN (ref 60–?)
GLOBULIN SER CALC-MCNC: 2.5 G/DL (ref 1.3–3.2)
GLUCOSE: 353 MG/DL (ref 74–100)
HBA1C MFR BLD: 11.4 % (ref 4–6)
HCT VFR BLD CALC: 47 % (ref 37–47)
HDLC SERPL-MCNC: 63 MG/DL (ref 40–60)
HGB BLD-MCNC: 14.3 G/DL (ref 12.2–16.2)
MCHC RBC-ENTMCNC: 30.5 G/DL (ref 31.8–35.4)
MCV RBC: 98.4 FL (ref 81–99)
MEAN CORPUSCULAR HEMOGLOBIN: 30 PG (ref 27–31.2)
PLATELET # BLD: 216 K/MM3 (ref 142–424)
POTASSIUM: 4.2 MMOL/L (ref 3.5–5.1)
PROT SERPL-MCNC: 6.6 G/DL (ref 6.3–8.2)
RBC # BLD AUTO: 4.78 M/MM3 (ref 4.2–5.4)
SODIUM SPEC-SCNC: 140 MMOL/L (ref 136–145)
TRIGLYCERIDES: 208 MG/DL (ref 30–150)
TSH SERPL-ACNC: 0.47 UIU/ML (ref 0.47–4.68)
WBC # BLD AUTO: 9.1 K/MM3 (ref 4.8–10.8)

## 2023-07-25 PROCEDURE — 85025 COMPLETE CBC W/AUTO DIFF WBC: CPT

## 2023-07-25 PROCEDURE — 82306 VITAMIN D 25 HYDROXY: CPT

## 2023-07-25 PROCEDURE — 84443 ASSAY THYROID STIM HORMONE: CPT

## 2023-07-25 PROCEDURE — 83036 HEMOGLOBIN GLYCOSYLATED A1C: CPT

## 2023-07-25 PROCEDURE — 80053 COMPREHEN METABOLIC PANEL: CPT

## 2023-07-25 PROCEDURE — 80061 LIPID PANEL: CPT

## 2023-08-04 ENCOUNTER — HOSPITAL ENCOUNTER (OUTPATIENT)
Age: 58
End: 2023-08-04
Payer: COMMERCIAL

## 2023-08-04 DIAGNOSIS — I65.23: Primary | ICD-10-CM

## 2023-08-04 DIAGNOSIS — Z86.73: ICD-10-CM

## 2023-08-04 PROCEDURE — 93880 EXTRACRANIAL BILAT STUDY: CPT

## 2023-08-04 PROCEDURE — 70551 MRI BRAIN STEM W/O DYE: CPT

## 2023-08-10 ENCOUNTER — HOSPITAL ENCOUNTER (OUTPATIENT)
Age: 58
End: 2023-08-10
Payer: COMMERCIAL

## 2023-08-10 DIAGNOSIS — E11.9: Primary | ICD-10-CM

## 2023-08-10 DIAGNOSIS — M79.672: ICD-10-CM

## 2023-08-10 DIAGNOSIS — M79.671: ICD-10-CM

## 2023-08-10 PROCEDURE — 73630 X-RAY EXAM OF FOOT: CPT

## 2023-11-02 ENCOUNTER — HOSPITAL ENCOUNTER (OUTPATIENT)
Age: 58
End: 2023-11-02
Payer: COMMERCIAL

## 2023-11-02 DIAGNOSIS — R13.10: Primary | ICD-10-CM

## 2023-11-02 PROCEDURE — 70371 SPEECH EVALUATION COMPLEX: CPT

## 2023-11-02 PROCEDURE — 92611 MOTION FLUOROSCOPY/SWALLOW: CPT

## 2024-02-14 ENCOUNTER — HOSPITAL ENCOUNTER (EMERGENCY)
Facility: HOSPITAL | Age: 59
Discharge: HOME | End: 2024-02-14
Attending: EMERGENCY MEDICINE | Admitting: EMERGENCY MEDICINE
Payer: COMMERCIAL

## 2024-02-14 ENCOUNTER — APPOINTMENT (OUTPATIENT)
Dept: GENERAL RADIOLOGY | Facility: HOSPITAL | Age: 59
End: 2024-02-14
Payer: COMMERCIAL

## 2024-02-14 VITALS
RESPIRATION RATE: 12 BRPM | OXYGEN SATURATION: 97 % | HEART RATE: 86 BPM | BODY MASS INDEX: 19.97 KG/M2 | HEIGHT: 64 IN | DIASTOLIC BLOOD PRESSURE: 72 MMHG | WEIGHT: 117 LBS | SYSTOLIC BLOOD PRESSURE: 179 MMHG | TEMPERATURE: 98 F

## 2024-02-14 DIAGNOSIS — R73.9 HYPERGLYCEMIA: ICD-10-CM

## 2024-02-14 DIAGNOSIS — N39.0 ACUTE UTI: Primary | ICD-10-CM

## 2024-02-14 LAB
ALBUMIN SERPL-MCNC: 3.6 G/DL (ref 3.5–5.2)
ALBUMIN/GLOB SERPL: 1.1 G/DL
ALP SERPL-CCNC: 76 U/L (ref 39–117)
ALT SERPL W P-5'-P-CCNC: 16 U/L (ref 1–33)
ANION GAP SERPL CALCULATED.3IONS-SCNC: 19 MMOL/L (ref 5–15)
AST SERPL-CCNC: 12 U/L (ref 1–32)
BACTERIA UR QL AUTO: ABNORMAL /HPF
BASOPHILS # BLD AUTO: 0.03 10*3/MM3 (ref 0–0.2)
BASOPHILS NFR BLD AUTO: 0.3 % (ref 0–1.5)
BILIRUB SERPL-MCNC: 0.8 MG/DL (ref 0–1.2)
BILIRUB UR QL STRIP: NEGATIVE
BUN SERPL-MCNC: 26 MG/DL (ref 6–20)
BUN/CREAT SERPL: 33.3 (ref 7–25)
CALCIUM SPEC-SCNC: 9 MG/DL (ref 8.6–10.5)
CHLORIDE SERPL-SCNC: 93 MMOL/L (ref 98–107)
CLARITY UR: ABNORMAL
CO2 SERPL-SCNC: 22 MMOL/L (ref 22–29)
COLOR UR: YELLOW
CREAT SERPL-MCNC: 0.78 MG/DL (ref 0.57–1)
D-LACTATE SERPL-SCNC: 1.4 MMOL/L (ref 0.5–2)
DEPRECATED RDW RBC AUTO: 39.1 FL (ref 37–54)
EGFRCR SERPLBLD CKD-EPI 2021: 88.2 ML/MIN/1.73
EOSINOPHIL # BLD AUTO: 0.01 10*3/MM3 (ref 0–0.4)
EOSINOPHIL NFR BLD AUTO: 0.1 % (ref 0.3–6.2)
ERYTHROCYTE [DISTWIDTH] IN BLOOD BY AUTOMATED COUNT: 11.5 % (ref 12.3–15.4)
FLUAV RNA RESP QL NAA+PROBE: NOT DETECTED
FLUBV RNA RESP QL NAA+PROBE: NOT DETECTED
GLOBULIN UR ELPH-MCNC: 3.3 GM/DL
GLUCOSE SERPL-MCNC: 317 MG/DL (ref 65–99)
GLUCOSE UR STRIP-MCNC: ABNORMAL MG/DL
HCT VFR BLD AUTO: 46.8 % (ref 34–46.6)
HGB BLD-MCNC: 16.3 G/DL (ref 12–15.9)
HGB UR QL STRIP.AUTO: ABNORMAL
HOLD SPECIMEN: NORMAL
HYALINE CASTS UR QL AUTO: ABNORMAL /LPF
IMM GRANULOCYTES # BLD AUTO: 0.05 10*3/MM3 (ref 0–0.05)
IMM GRANULOCYTES NFR BLD AUTO: 0.4 % (ref 0–0.5)
KETONES UR QL STRIP: ABNORMAL
LEUKOCYTE ESTERASE UR QL STRIP.AUTO: ABNORMAL
LIPASE SERPL-CCNC: 14 U/L (ref 13–60)
LYMPHOCYTES # BLD AUTO: 2.93 10*3/MM3 (ref 0.7–3.1)
LYMPHOCYTES NFR BLD AUTO: 24.6 % (ref 19.6–45.3)
MCH RBC QN AUTO: 32 PG (ref 26.6–33)
MCHC RBC AUTO-ENTMCNC: 34.8 G/DL (ref 31.5–35.7)
MCV RBC AUTO: 91.9 FL (ref 79–97)
MONOCYTES # BLD AUTO: 0.82 10*3/MM3 (ref 0.1–0.9)
MONOCYTES NFR BLD AUTO: 6.9 % (ref 5–12)
NEUTROPHILS NFR BLD AUTO: 67.7 % (ref 42.7–76)
NEUTROPHILS NFR BLD AUTO: 8.06 10*3/MM3 (ref 1.7–7)
NITRITE UR QL STRIP: POSITIVE
NRBC BLD AUTO-RTO: 0 /100 WBC (ref 0–0.2)
PH UR STRIP.AUTO: 6.5 [PH] (ref 5–8)
PLATELET # BLD AUTO: 300 10*3/MM3 (ref 140–450)
PMV BLD AUTO: 11.7 FL (ref 6–12)
POTASSIUM SERPL-SCNC: 3.6 MMOL/L (ref 3.5–5.2)
PROT SERPL-MCNC: 6.9 G/DL (ref 6–8.5)
PROT UR QL STRIP: ABNORMAL
RBC # BLD AUTO: 5.09 10*6/MM3 (ref 3.77–5.28)
RBC # UR STRIP: ABNORMAL /HPF
REF LAB TEST METHOD: ABNORMAL
SARS-COV-2 RNA RESP QL NAA+PROBE: NOT DETECTED
SODIUM SERPL-SCNC: 134 MMOL/L (ref 136–145)
SP GR UR STRIP: 1.02 (ref 1–1.03)
SQUAMOUS #/AREA URNS HPF: ABNORMAL /HPF
UROBILINOGEN UR QL STRIP: ABNORMAL
WBC # UR STRIP: ABNORMAL /HPF
WBC NRBC COR # BLD AUTO: 11.9 10*3/MM3 (ref 3.4–10.8)
WHOLE BLOOD HOLD COAG: NORMAL
WHOLE BLOOD HOLD SPECIMEN: NORMAL

## 2024-02-14 PROCEDURE — 99283 EMERGENCY DEPT VISIT LOW MDM: CPT

## 2024-02-14 PROCEDURE — 87636 SARSCOV2 & INF A&B AMP PRB: CPT | Performed by: EMERGENCY MEDICINE

## 2024-02-14 PROCEDURE — 96365 THER/PROPH/DIAG IV INF INIT: CPT

## 2024-02-14 PROCEDURE — 25810000003 SODIUM CHLORIDE 0.9 % SOLUTION: Performed by: EMERGENCY MEDICINE

## 2024-02-14 PROCEDURE — 80053 COMPREHEN METABOLIC PANEL: CPT | Performed by: EMERGENCY MEDICINE

## 2024-02-14 PROCEDURE — 25010000002 ONDANSETRON PER 1 MG: Performed by: EMERGENCY MEDICINE

## 2024-02-14 PROCEDURE — 36415 COLL VENOUS BLD VENIPUNCTURE: CPT

## 2024-02-14 PROCEDURE — 25010000002 CEFTRIAXONE PER 250 MG: Performed by: EMERGENCY MEDICINE

## 2024-02-14 PROCEDURE — 87040 BLOOD CULTURE FOR BACTERIA: CPT | Performed by: EMERGENCY MEDICINE

## 2024-02-14 PROCEDURE — 96375 TX/PRO/DX INJ NEW DRUG ADDON: CPT

## 2024-02-14 PROCEDURE — 83690 ASSAY OF LIPASE: CPT | Performed by: EMERGENCY MEDICINE

## 2024-02-14 PROCEDURE — 85025 COMPLETE CBC W/AUTO DIFF WBC: CPT | Performed by: EMERGENCY MEDICINE

## 2024-02-14 PROCEDURE — 81001 URINALYSIS AUTO W/SCOPE: CPT | Performed by: EMERGENCY MEDICINE

## 2024-02-14 PROCEDURE — 83605 ASSAY OF LACTIC ACID: CPT | Performed by: EMERGENCY MEDICINE

## 2024-02-14 PROCEDURE — 71045 X-RAY EXAM CHEST 1 VIEW: CPT

## 2024-02-14 RX ORDER — ONDANSETRON 4 MG/1
4 TABLET, FILM COATED ORAL EVERY 8 HOURS PRN
Qty: 15 TABLET | Refills: 0 | Status: SHIPPED | OUTPATIENT
Start: 2024-02-14

## 2024-02-14 RX ORDER — SODIUM CHLORIDE 9 MG/ML
10 INJECTION, SOLUTION INTRAMUSCULAR; INTRAVENOUS; SUBCUTANEOUS AS NEEDED
Status: DISCONTINUED | OUTPATIENT
Start: 2024-02-14 | End: 2024-02-14 | Stop reason: HOSPADM

## 2024-02-14 RX ORDER — ONDANSETRON 2 MG/ML
4 INJECTION INTRAMUSCULAR; INTRAVENOUS ONCE
Status: COMPLETED | OUTPATIENT
Start: 2024-02-14 | End: 2024-02-14

## 2024-02-14 RX ORDER — CEFDINIR 300 MG/1
300 CAPSULE ORAL 2 TIMES DAILY
Qty: 14 CAPSULE | Refills: 0 | Status: SHIPPED | OUTPATIENT
Start: 2024-02-14 | End: 2024-02-21

## 2024-02-14 RX ADMIN — ONDANSETRON 4 MG: 2 INJECTION INTRAMUSCULAR; INTRAVENOUS at 09:50

## 2024-02-14 RX ADMIN — SODIUM CHLORIDE 1000 ML: 9 INJECTION, SOLUTION INTRAVENOUS at 09:48

## 2024-02-14 RX ADMIN — CEFTRIAXONE 2 G: 2 INJECTION, POWDER, FOR SOLUTION INTRAMUSCULAR; INTRAVENOUS at 12:24

## 2024-02-14 NOTE — ED PROVIDER NOTES
Subjective   History of Present Illness  58-year-old female presents for evaluation of cough, nausea, vomiting, and diarrhea.  Of note, the patient notes that she had a sick contact at home last week with identical symptoms.  She tells me that for the past 5 days she has been symptomatic with the aforementioned symptoms.  She went to an outside facility this past week regarding her symptoms and was discharged home following a negative workup.  However, her symptoms have persisted so she came here today for a second opinion.  She denies any fevers.  She denies any known exposures to anyone with COVID-19.  She endorses a nagging cough as well.      Review of Systems   Respiratory:  Positive for cough.    Gastrointestinal:  Positive for diarrhea, nausea and vomiting.   All other systems reviewed and are negative.      Past Medical History:   Diagnosis Date    Acid reflux     Type 2 diabetes mellitus        No Known Allergies    Past Surgical History:   Procedure Laterality Date    BACK SURGERY         History reviewed. No pertinent family history.    Social History     Socioeconomic History    Marital status: Single   Tobacco Use    Smoking status: Some Days    Smokeless tobacco: Never   Substance and Sexual Activity    Alcohol use: Never    Sexual activity: Not Currently           Objective   Physical Exam  Vitals and nursing note reviewed.   Constitutional:       General: She is not in acute distress.     Appearance: She is well-developed. She is not diaphoretic.      Comments: Nontoxic-appearing female   HENT:      Head: Normocephalic and atraumatic.   Eyes:      Pupils: Pupils are equal, round, and reactive to light.   Neck:      Comments: No meningeal signs or nuchal rigidity  Cardiovascular:      Rate and Rhythm: Normal rate and regular rhythm.      Heart sounds: Normal heart sounds. No murmur heard.     No friction rub. No gallop.   Pulmonary:      Effort: Pulmonary effort is normal. No respiratory distress.       Breath sounds: Normal breath sounds. No wheezing or rales.   Abdominal:      General: Bowel sounds are normal. There is no distension.      Palpations: Abdomen is soft. There is no mass.      Tenderness: There is no abdominal tenderness. There is no guarding or rebound.      Comments: No focal abdominal tenderness, no peritoneal signs, no pain out of proportion to exam   Musculoskeletal:         General: Normal range of motion.   Skin:     General: Skin is warm and dry.      Findings: No erythema or rash.   Neurological:      Mental Status: She is alert and oriented to person, place, and time.   Psychiatric:         Mood and Affect: Mood normal.         Thought Content: Thought content normal.         Judgment: Judgment normal.         Procedures           ED Course  ED Course as of 02/14/24 1602   Wed Feb 14, 2024   1017 58-year-old female presents for evaluation of cough, nausea, vomiting, and diarrhea.  Of note, the patient notes that she had a sick contact at home last week with identical symptoms.  She has been symptomatic now for 5 days.  She went to an outside facility this past week regarding her symptoms.  On arrival to the ED, the patient is nontoxic-appearing.  Nonsurgical abdomen.  Vital signs reassuring.  Broad differential diagnosis.  We will obtain labs and imaging, and we will reassess following initial interventions. [DD]   1018 Of note, I obtained outside records from [facility].  The patient visited the emergency department there on February 9.  I reviewed her labs and ED course from her stay. [DD]   1121 I personally and independently viewed the patient's x-ray images myself, and I am in agreement with the radiologist's reading for final interpretation--  Particularly, there is no pneumonia noted. [DD]   1121 Urinalysis is suggestive of infection.  Rocephin given.  Urine culture obtained.  Labs are additionally remarkable for hyperglycemia. [DD]   1437 Upon reevaluation, the  patient looks and feels much improved.  She is tolerating p.o.  I feel that she can be managed on an outpatient basis.  Prescription for cefdinir.  Prescription for Zofran as needed.  She will follow-up with her primary care physician within the next week.  Agreeable with plan and given appropriate strict return precautions. [DD]      ED Course User Index  [DD] Medardo Duarte MD                                   Recent Results (from the past 24 hour(s))   Comprehensive Metabolic Panel    Collection Time: 02/14/24  9:56 AM    Specimen: Blood   Result Value Ref Range    Glucose 317 (H) 65 - 99 mg/dL    BUN 26 (H) 6 - 20 mg/dL    Creatinine 0.78 0.57 - 1.00 mg/dL    Sodium 134 (L) 136 - 145 mmol/L    Potassium 3.6 3.5 - 5.2 mmol/L    Chloride 93 (L) 98 - 107 mmol/L    CO2 22.0 22.0 - 29.0 mmol/L    Calcium 9.0 8.6 - 10.5 mg/dL    Total Protein 6.9 6.0 - 8.5 g/dL    Albumin 3.6 3.5 - 5.2 g/dL    ALT (SGPT) 16 1 - 33 U/L    AST (SGOT) 12 1 - 32 U/L    Alkaline Phosphatase 76 39 - 117 U/L    Total Bilirubin 0.8 0.0 - 1.2 mg/dL    Globulin 3.3 gm/dL    A/G Ratio 1.1 g/dL    BUN/Creatinine Ratio 33.3 (H) 7.0 - 25.0    Anion Gap 19.0 (H) 5.0 - 15.0 mmol/L    eGFR 88.2 >60.0 mL/min/1.73   Lipase    Collection Time: 02/14/24  9:56 AM    Specimen: Blood   Result Value Ref Range    Lipase 14 13 - 60 U/L   Lactic Acid, Plasma    Collection Time: 02/14/24  9:56 AM    Specimen: Blood   Result Value Ref Range    Lactate 1.4 0.5 - 2.0 mmol/L   Green Top (Gel)    Collection Time: 02/14/24  9:56 AM   Result Value Ref Range    Extra Tube Hold for add-ons.    Lavender Top    Collection Time: 02/14/24  9:56 AM   Result Value Ref Range    Extra Tube hold for add-on    Gold Top - SST    Collection Time: 02/14/24  9:56 AM   Result Value Ref Range    Extra Tube Hold for add-ons.    Gray Top    Collection Time: 02/14/24  9:56 AM   Result Value Ref Range    Extra Tube Hold for add-ons.    Light Blue Top    Collection Time: 02/14/24  9:56  AM   Result Value Ref Range    Extra Tube Hold for add-ons.    CBC Auto Differential    Collection Time: 02/14/24  9:56 AM    Specimen: Blood   Result Value Ref Range    WBC 11.90 (H) 3.40 - 10.80 10*3/mm3    RBC 5.09 3.77 - 5.28 10*6/mm3    Hemoglobin 16.3 (H) 12.0 - 15.9 g/dL    Hematocrit 46.8 (H) 34.0 - 46.6 %    MCV 91.9 79.0 - 97.0 fL    MCH 32.0 26.6 - 33.0 pg    MCHC 34.8 31.5 - 35.7 g/dL    RDW 11.5 (L) 12.3 - 15.4 %    RDW-SD 39.1 37.0 - 54.0 fl    MPV 11.7 6.0 - 12.0 fL    Platelets 300 140 - 450 10*3/mm3    Neutrophil % 67.7 42.7 - 76.0 %    Lymphocyte % 24.6 19.6 - 45.3 %    Monocyte % 6.9 5.0 - 12.0 %    Eosinophil % 0.1 (L) 0.3 - 6.2 %    Basophil % 0.3 0.0 - 1.5 %    Immature Grans % 0.4 0.0 - 0.5 %    Neutrophils, Absolute 8.06 (H) 1.70 - 7.00 10*3/mm3    Lymphocytes, Absolute 2.93 0.70 - 3.10 10*3/mm3    Monocytes, Absolute 0.82 0.10 - 0.90 10*3/mm3    Eosinophils, Absolute 0.01 0.00 - 0.40 10*3/mm3    Basophils, Absolute 0.03 0.00 - 0.20 10*3/mm3    Immature Grans, Absolute 0.05 0.00 - 0.05 10*3/mm3    nRBC 0.0 0.0 - 0.2 /100 WBC   COVID-19 and FLU A/B PCR, 1 HR TAT - Swab, Nasopharynx    Collection Time: 02/14/24  9:57 AM    Specimen: Nasopharynx; Swab   Result Value Ref Range    COVID19 Not Detected Not Detected - Ref. Range    Influenza A PCR Not Detected Not Detected    Influenza B PCR Not Detected Not Detected   Urinalysis With Microscopic If Indicated (No Culture) - Urine, Clean Catch    Collection Time: 02/14/24 10:58 AM    Specimen: Urine, Clean Catch   Result Value Ref Range    Color, UA Yellow Yellow, Straw    Appearance, UA Cloudy (A) Clear    pH, UA 6.5 5.0 - 8.0    Specific Gravity, UA 1.022 1.001 - 1.030    Glucose, UA >=1000 mg/dL (3+) (A) Negative    Ketones, UA 80 mg/dL (3+) (A) Negative    Bilirubin, UA Negative Negative    Blood, UA Trace (A) Negative    Protein, UA 30 mg/dL (1+) (A) Negative    Leuk Esterase, UA Small (1+) (A) Negative    Nitrite, UA Positive (A) Negative     Urobilinogen, UA 1.0 E.U./dL 0.2 - 1.0 E.U./dL   Urinalysis, Microscopic Only - Urine, Clean Catch    Collection Time: 02/14/24 10:58 AM    Specimen: Urine, Clean Catch   Result Value Ref Range    RBC, UA 0-2 None Seen, 0-2 /HPF    WBC, UA Too Numerous to Count (A) None Seen, 0-2 /HPF    Bacteria, UA 4+ (A) None Seen, Trace /HPF    Squamous Epithelial Cells, UA 3-6 (A) None Seen, 0-2 /HPF    Hyaline Casts, UA 7-12 0 - 6 /LPF    Methodology Automated Microscopy      Note: In addition to lab results from this visit, the labs listed above may include labs taken at another facility or during a different encounter within the last 24 hours. Please correlate lab times with ED admission and discharge times for further clarification of the services performed during this visit.    XR Chest 1 View   Final Result   Impression:   No acute process.         Electronically Signed: Kisha Mireles MD     2/14/2024 10:11 AM EST     Workstation ID: [id number]        Vitals:    02/14/24 1312 02/14/24 1315 02/14/24 1327 02/14/24 1330   BP:       Pulse: 98 86 84 86   Resp:       Temp:       TempSrc:       SpO2: 95% 95% 95% 97%   Weight:       Height:         Medications   sodium chloride 0.9 % bolus 1,000 mL (0 mL Intravenous Stopped 2/14/24 1224)   ondansetron (ZOFRAN) injection 4 mg (4 mg Intravenous Given 2/14/24 0950)   cefTRIAXone (ROCEPHIN) 2 g in sodium chloride 0.9 % 100 mL IVPB (0 g Intravenous Stopped 2/14/24 1323)     ECG/EMG Results (last 24 hours)       ** No results found for the last 24 hours. **          No orders to display                 Medical Decision Making  Problems Addressed:  Acute UTI: complicated acute illness or injury  Hyperglycemia: complicated acute illness or injury    Amount and/or Complexity of Data Reviewed  Labs: ordered.  Radiology: ordered.    Risk  Prescription drug management.        Final diagnoses:   Acute UTI   Hyperglycemia       ED Disposition  ED Disposition       ED Disposition   Discharge     Condition   Stable    Comment   --               Paris Osei PA  [address] [facility] [id number] [phone]    In 1 week           Medication List        New Prescriptions      cefdinir 300 MG capsule  Commonly known as: OMNICEF  Take 1 capsule by mouth 2 (Two) Times a Day for 7 days.     ondansetron 4 MG tablet  Commonly known as: ZOFRAN  Take 1 tablet by mouth Every 8 (Eight) Hours As Needed for Nausea.               Where to Get Your Medications        These medications were sent to [facility] - [place], [place] - [address] [place] [address] [phone] [place] [id number] [address] [address]      Phone: [phone]   cefdinir 300 MG capsule  ondansetron 4 MG tablet            Medardo Duarte MD  02/14/24 4969

## 2024-02-19 LAB
BACTERIA SPEC AEROBE CULT: NORMAL
BACTERIA SPEC AEROBE CULT: NORMAL

## 2024-03-06 ENCOUNTER — HOSPITAL ENCOUNTER (OUTPATIENT)
Dept: HOSPITAL 22 - LAB.DROPOF | Age: 59
End: 2024-03-06
Payer: COMMERCIAL

## 2024-03-06 DIAGNOSIS — N39.0: ICD-10-CM

## 2024-03-06 DIAGNOSIS — E11.65: Primary | ICD-10-CM

## 2024-03-06 PROCEDURE — 87086 URINE CULTURE/COLONY COUNT: CPT

## 2024-03-06 PROCEDURE — 82570 ASSAY OF URINE CREATININE: CPT

## 2024-03-06 PROCEDURE — 82043 UR ALBUMIN QUANTITATIVE: CPT

## 2024-06-13 ENCOUNTER — HOSPITAL ENCOUNTER (OUTPATIENT)
Dept: HOSPITAL 22 - LAB.DROPOF | Age: 59
Discharge: HOME | End: 2024-06-13
Payer: COMMERCIAL

## 2024-06-13 DIAGNOSIS — M54.9: Primary | ICD-10-CM

## 2024-06-13 PROCEDURE — 87086 URINE CULTURE/COLONY COUNT: CPT

## 2024-11-19 ENCOUNTER — HOSPITAL ENCOUNTER (OUTPATIENT)
Dept: HOSPITAL 22 - RAD | Age: 59
Discharge: HOME | End: 2024-11-19
Payer: COMMERCIAL

## 2024-11-19 DIAGNOSIS — M48.062: Primary | ICD-10-CM

## 2024-11-19 PROCEDURE — 72148 MRI LUMBAR SPINE W/O DYE: CPT
